# Patient Record
Sex: MALE | Race: WHITE | NOT HISPANIC OR LATINO
[De-identification: names, ages, dates, MRNs, and addresses within clinical notes are randomized per-mention and may not be internally consistent; named-entity substitution may affect disease eponyms.]

---

## 2017-12-02 PROBLEM — Z00.00 ENCOUNTER FOR PREVENTIVE HEALTH EXAMINATION: Status: ACTIVE | Noted: 2017-12-02

## 2017-12-06 ENCOUNTER — APPOINTMENT (OUTPATIENT)
Dept: NEUROLOGY | Facility: CLINIC | Age: 77
End: 2017-12-06
Payer: MEDICARE

## 2017-12-06 VITALS
DIASTOLIC BLOOD PRESSURE: 74 MMHG | SYSTOLIC BLOOD PRESSURE: 160 MMHG | WEIGHT: 212 LBS | BODY MASS INDEX: 28.71 KG/M2 | HEIGHT: 72 IN

## 2017-12-06 DIAGNOSIS — Z87.39 PERSONAL HISTORY OF OTHER DISEASES OF THE MUSCULOSKELETAL SYSTEM AND CONNECTIVE TISSUE: ICD-10-CM

## 2017-12-06 DIAGNOSIS — Z87.891 PERSONAL HISTORY OF NICOTINE DEPENDENCE: ICD-10-CM

## 2017-12-06 PROCEDURE — 99214 OFFICE O/P EST MOD 30 MIN: CPT

## 2017-12-06 RX ORDER — ALLOPURINOL 100 MG/1
100 TABLET ORAL
Refills: 0 | Status: ACTIVE | COMMUNITY

## 2017-12-06 RX ORDER — PRAVASTATIN SODIUM 40 MG/1
40 TABLET ORAL
Refills: 0 | Status: ACTIVE | COMMUNITY

## 2018-01-08 ENCOUNTER — MEDICATION RENEWAL (OUTPATIENT)
Age: 78
End: 2018-01-08

## 2018-01-08 RX ORDER — MEMANTINE HYDROCHLORIDE 10 MG/1
10 TABLET, FILM COATED ORAL
Refills: 0 | Status: COMPLETED | COMMUNITY

## 2018-01-08 RX ORDER — VIT C/E/ZN/COPPR/LUTEIN/ZEAXAN 250MG-90MG
CAPSULE ORAL
Refills: 0 | Status: ACTIVE | COMMUNITY

## 2018-01-09 ENCOUNTER — MEDICATION RENEWAL (OUTPATIENT)
Age: 78
End: 2018-01-09

## 2018-04-04 ENCOUNTER — APPOINTMENT (OUTPATIENT)
Dept: NEUROLOGY | Facility: CLINIC | Age: 78
End: 2018-04-04
Payer: MEDICARE

## 2018-04-04 VITALS
DIASTOLIC BLOOD PRESSURE: 72 MMHG | BODY MASS INDEX: 28.71 KG/M2 | SYSTOLIC BLOOD PRESSURE: 110 MMHG | HEIGHT: 72 IN | WEIGHT: 212 LBS

## 2018-04-04 DIAGNOSIS — Z86.39 PERSONAL HISTORY OF OTHER ENDOCRINE, NUTRITIONAL AND METABOLIC DISEASE: ICD-10-CM

## 2018-04-04 DIAGNOSIS — Z86.79 PERSONAL HISTORY OF OTHER DISEASES OF THE CIRCULATORY SYSTEM: ICD-10-CM

## 2018-04-04 PROCEDURE — 99214 OFFICE O/P EST MOD 30 MIN: CPT

## 2018-07-10 ENCOUNTER — MEDICATION RENEWAL (OUTPATIENT)
Age: 78
End: 2018-07-10

## 2018-10-03 ENCOUNTER — APPOINTMENT (OUTPATIENT)
Dept: NEUROLOGY | Facility: CLINIC | Age: 78
End: 2018-10-03
Payer: MEDICARE

## 2018-10-03 VITALS
BODY MASS INDEX: 28.71 KG/M2 | DIASTOLIC BLOOD PRESSURE: 74 MMHG | WEIGHT: 212 LBS | SYSTOLIC BLOOD PRESSURE: 136 MMHG | HEIGHT: 72 IN

## 2018-10-03 PROCEDURE — 99213 OFFICE O/P EST LOW 20 MIN: CPT

## 2018-10-03 NOTE — CONSULT LETTER
[Dear  ___] : Dear  [unfilled], [Courtesy Letter:] : I had the pleasure of seeing your patient, [unfilled], in my office today. [Please see my note below.] : Please see my note below. [Consult Closing:] : Thank you very much for allowing me to participate in the care of this patient.  If you have any questions, please do not hesitate to contact me. [Sincerely,] : Sincerely, [FreeTextEntry3] : Kunal Starks MD.

## 2018-10-03 NOTE — HISTORY OF PRESENT ILLNESS
[FreeTextEntry1] : I saw this patient in the office today.\par \par As you recall he has a history of dementia.\par His difficulty has been more for short-term memory than long-term memory.\par He began having memory difficulty around 2013. I had first seen him for this her in 2014. He already been started on Exelon a week or so before his visit here.\par His memory difficulty gradually progressed. I had added Namenda.\par The cognitive difficulties had persisted ever since.\par \par His wife had reported that he is started to have difficulty with activities of daily living including dressing and hygiene.\par \par His wife now reports that his insurance is not going to be covering Exelon after January 1.\par

## 2018-10-03 NOTE — PHYSICAL EXAM
[General Appearance - Alert] : alert [Oriented To Time, Place, And Person] : oriented to person, place, and time [Affect] : the affect was normal [Recall ___ / 3] : recall [unfilled] / 3 [Cranial Nerves Optic (II)] : visual acuity intact bilaterally,  visual fields full to confrontation, pupils equal round and reactive to light [Cranial Nerves Oculomotor (III)] : extraocular motion intact [Cranial Nerves Trigeminal (V)] : facial sensation intact symmetrically [Cranial Nerves Facial (VII)] : face symmetrical [Cranial Nerves Vestibulocochlear (VIII)] : hearing was intact bilaterally [Cranial Nerves Glossopharyngeal (IX)] : tongue and palate midline [Cranial Nerves Accessory (XI - Cranial And Spinal)] : head turning and shoulder shrug symmetric [Cranial Nerves Hypoglossal (XII)] : there was no tongue deviation with protrusion [Motor Tone] : muscle tone was normal in all four extremities [Motor Strength] : muscle strength was normal in all four extremities [Sensation Tactile Decrease] : light touch was intact [Sensation Vibration Decrease] : vibration was intact [Abnormal Walk] : normal gait [2+] : Patella left 2+ [Aphasia] : no dysphasia/aphasia [Romberg's Sign] : Romberg's sign was negtive [Coordination - Dysmetria Impaired Finger-to-Nose Bilateral] : not present [Plantar Reflex Right Only] : normal on the right [Plantar Reflex Left Only] : normal on the left

## 2018-10-03 NOTE — DATA REVIEWED
[de-identified] : CT scan of the head from 10/15/14 demonstrated only some mild atrophy. This was performed at [facility].\par \par B12 and thyroid function in the past have been normal. RPR was nonreactive.\par \par

## 2018-10-03 NOTE — ASSESSMENT
[FreeTextEntry1] : This is a 78-year-old man with Alzheimer's type dementia. \par \par I will continue his current regimen of Exelon and Namenda.\par His wife reports that after January his insurance is no longer going to cover Exelon. I have explained that if this is the case then we may be to continue the Namenda as monotherapy.\par \par I will see him back in 6 months.

## 2019-01-08 ENCOUNTER — MEDICATION RENEWAL (OUTPATIENT)
Age: 79
End: 2019-01-08

## 2019-04-03 ENCOUNTER — APPOINTMENT (OUTPATIENT)
Dept: NEUROLOGY | Facility: CLINIC | Age: 79
End: 2019-04-03
Payer: MEDICARE

## 2019-04-03 VITALS
SYSTOLIC BLOOD PRESSURE: 160 MMHG | WEIGHT: 197 LBS | DIASTOLIC BLOOD PRESSURE: 100 MMHG | HEIGHT: 72 IN | BODY MASS INDEX: 26.68 KG/M2

## 2019-04-03 PROCEDURE — 99213 OFFICE O/P EST LOW 20 MIN: CPT

## 2019-04-03 RX ORDER — LABETALOL HYDROCHLORIDE 100 MG/1
100 TABLET, FILM COATED ORAL
Refills: 0 | Status: COMPLETED | COMMUNITY
End: 2019-04-03

## 2019-04-03 RX ORDER — METOPROLOL SUCCINATE 100 MG/1
100 TABLET, EXTENDED RELEASE ORAL
Refills: 0 | Status: ACTIVE | COMMUNITY

## 2019-04-03 RX ORDER — RIVASTIGMINE TARTRATE 1.5 MG/1
1.5 CAPSULE ORAL
Refills: 0 | Status: COMPLETED | COMMUNITY
End: 2019-04-03

## 2019-04-03 NOTE — PHYSICAL EXAM
[General Appearance - Alert] : alert [General Appearance - In No Acute Distress] : in no acute distress [Oriented To Time, Place, And Person] : oriented to person, place, and time [Affect] : the affect was normal [Recall ___ / 3] : recall [unfilled] / 3 [Cranial Nerves Optic (II)] : visual acuity intact bilaterally,  visual fields full to confrontation, pupils equal round and reactive to light [Cranial Nerves Oculomotor (III)] : extraocular motion intact [Cranial Nerves Trigeminal (V)] : facial sensation intact symmetrically [Cranial Nerves Facial (VII)] : face symmetrical [Cranial Nerves Vestibulocochlear (VIII)] : hearing was intact bilaterally [Cranial Nerves Glossopharyngeal (IX)] : tongue and palate midline [Cranial Nerves Accessory (XI - Cranial And Spinal)] : head turning and shoulder shrug symmetric [Cranial Nerves Hypoglossal (XII)] : there was no tongue deviation with protrusion [Motor Tone] : muscle tone was normal in all four extremities [Motor Strength] : muscle strength was normal in all four extremities [Sensation Tactile Decrease] : light touch was intact [Sensation Pain / Temperature Decrease] : pain and temperature was intact [Sensation Vibration Decrease] : vibration was intact [Abnormal Walk] : normal gait [2+] : Patella left 2+ [Optic Disc Abnormality] : the optic disc were normal in size and color [Edema] : there was no peripheral edema [Involuntary Movements] : no involuntary movements were seen [Dysarthria] : no dysarthria [Romberg's Sign] : Romberg's sign was negtive [Coordination - Dysmetria Impaired Finger-to-Nose Bilateral] : not present [Plantar Reflex Right Only] : normal on the right [Plantar Reflex Left Only] : normal on the left

## 2019-04-03 NOTE — ASSESSMENT
[FreeTextEntry1] : This is a 78 year-old man with Alzheimer's type dementia. \par \par I will continue his current regimen of Namenda.\par \par I have referred him back to speech therapy at his wife's request.\par \par I will see him back in 6 months.

## 2019-04-03 NOTE — DATA REVIEWED
[de-identified] : CT scan of the head from 10/15/14 demonstrated only some mild atrophy. This was performed at [facility].\par \par B12 and thyroid function in the past have been normal. RPR was nonreactive.\par \par

## 2019-09-04 ENCOUNTER — MEDICATION RENEWAL (OUTPATIENT)
Age: 79
End: 2019-09-04

## 2019-10-03 ENCOUNTER — APPOINTMENT (OUTPATIENT)
Dept: NEUROLOGY | Facility: CLINIC | Age: 79
End: 2019-10-03
Payer: MEDICARE

## 2019-10-03 VITALS
BODY MASS INDEX: 21.81 KG/M2 | DIASTOLIC BLOOD PRESSURE: 70 MMHG | HEIGHT: 72 IN | WEIGHT: 161 LBS | SYSTOLIC BLOOD PRESSURE: 118 MMHG

## 2019-10-03 PROCEDURE — 99213 OFFICE O/P EST LOW 20 MIN: CPT

## 2019-10-03 RX ORDER — VALSARTAN 80 MG/1
80 TABLET, COATED ORAL
Refills: 0 | Status: COMPLETED | COMMUNITY
End: 2019-10-03

## 2019-10-03 RX ORDER — LOSARTAN POTASSIUM 100 MG/1
100 TABLET, FILM COATED ORAL
Refills: 0 | Status: ACTIVE | COMMUNITY

## 2019-10-03 RX ORDER — AMLODIPINE BESYLATE 2.5 MG/1
2.5 TABLET ORAL
Refills: 0 | Status: ACTIVE | COMMUNITY

## 2019-10-03 NOTE — DATA REVIEWED
[de-identified] : CT scan of the head from 10/15/14 demonstrated only some mild atrophy. This was performed at [facility].\par \par B12 and thyroid function in the past have been normal. RPR was nonreactive.\par \par

## 2019-10-03 NOTE — CONSULT LETTER
[Dear  ___] : Dear  [unfilled], [Please see my note below.] : Please see my note below. [Courtesy Letter:] : I had the pleasure of seeing your patient, [unfilled], in my office today. [Consult Closing:] : Thank you very much for allowing me to participate in the care of this patient.  If you have any questions, please do not hesitate to contact me. [Sincerely,] : Sincerely, [FreeTextEntry3] : Kunal Starks MD.

## 2019-10-03 NOTE — ASSESSMENT
[FreeTextEntry1] : This is a 79 year-old man with Alzheimer's type dementia. \par \par I will continue his current regimen of Namenda.\par \par I will see him back in 6 months.

## 2019-10-03 NOTE — PHYSICAL EXAM
[General Appearance - Alert] : alert [General Appearance - In No Acute Distress] : in no acute distress [Affect] : the affect was normal [Oriented To Time, Place, And Person] : oriented to person, place, and time [Recall ___ / 3] : recall [unfilled] / 3 [Cranial Nerves Optic (II)] : visual acuity intact bilaterally,  visual fields full to confrontation, pupils equal round and reactive to light [Cranial Nerves Oculomotor (III)] : extraocular motion intact [Cranial Nerves Facial (VII)] : face symmetrical [Cranial Nerves Trigeminal (V)] : facial sensation intact symmetrically [Cranial Nerves Glossopharyngeal (IX)] : tongue and palate midline [Cranial Nerves Vestibulocochlear (VIII)] : hearing was intact bilaterally [Cranial Nerves Accessory (XI - Cranial And Spinal)] : head turning and shoulder shrug symmetric [Cranial Nerves Hypoglossal (XII)] : there was no tongue deviation with protrusion [Motor Tone] : muscle tone was normal in all four extremities [Motor Strength] : muscle strength was normal in all four extremities [Sensation Tactile Decrease] : light touch was intact [Sensation Vibration Decrease] : vibration was intact [Sensation Pain / Temperature Decrease] : pain and temperature was intact [Abnormal Walk] : normal gait [2+] : Patella left 2+ [Optic Disc Abnormality] : the optic disc were normal in size and color [Involuntary Movements] : no involuntary movements were seen [Edema] : there was no peripheral edema [Dysarthria] : no dysarthria [Romberg's Sign] : Romberg's sign was negtive [Coordination - Dysmetria Impaired Finger-to-Nose Bilateral] : not present [Plantar Reflex Right Only] : normal on the right [Plantar Reflex Left Only] : normal on the left

## 2019-12-01 ENCOUNTER — INPATIENT (INPATIENT)
Facility: HOSPITAL | Age: 79
LOS: 1 days | Discharge: HOME | DRG: 100 | End: 2019-12-03
Attending: INTERNAL MEDICINE | Admitting: INTERNAL MEDICINE
Payer: MEDICARE

## 2019-12-01 ENCOUNTER — OUTPATIENT (OUTPATIENT)
Dept: OUTPATIENT SERVICES | Facility: HOSPITAL | Age: 79
LOS: 1 days | End: 2019-12-01
Payer: MEDICARE

## 2019-12-01 ENCOUNTER — TRANSCRIPTION ENCOUNTER (OUTPATIENT)
Age: 79
End: 2019-12-01

## 2019-12-01 VITALS — DIASTOLIC BLOOD PRESSURE: 84 MMHG | SYSTOLIC BLOOD PRESSURE: 121 MMHG

## 2019-12-01 DIAGNOSIS — R53.1 WEAKNESS: ICD-10-CM

## 2019-12-01 LAB
ALBUMIN SERPL ELPH-MCNC: 4.2 G/DL — SIGNIFICANT CHANGE UP (ref 3.3–5.2)
ALP SERPL-CCNC: 77 U/L — SIGNIFICANT CHANGE UP (ref 40–120)
ALT FLD-CCNC: 20 U/L — SIGNIFICANT CHANGE UP
ANION GAP SERPL CALC-SCNC: 20 MMOL/L — HIGH (ref 5–17)
APTT BLD: 35.7 SEC — SIGNIFICANT CHANGE UP (ref 27.5–36.3)
AST SERPL-CCNC: 22 U/L — SIGNIFICANT CHANGE UP
BASOPHILS # BLD AUTO: 0.06 K/UL — SIGNIFICANT CHANGE UP (ref 0–0.2)
BASOPHILS NFR BLD AUTO: 0.8 % — SIGNIFICANT CHANGE UP (ref 0–2)
BILIRUB SERPL-MCNC: 0.6 MG/DL — SIGNIFICANT CHANGE UP (ref 0.4–2)
BUN SERPL-MCNC: 27 MG/DL — HIGH (ref 8–20)
CALCIUM SERPL-MCNC: 9.4 MG/DL — SIGNIFICANT CHANGE UP (ref 8.6–10.2)
CHLORIDE SERPL-SCNC: 105 MMOL/L — SIGNIFICANT CHANGE UP (ref 98–107)
CO2 SERPL-SCNC: 17 MMOL/L — LOW (ref 22–29)
CREAT SERPL-MCNC: 1.45 MG/DL — HIGH (ref 0.5–1.3)
EOSINOPHIL # BLD AUTO: 0.14 K/UL — SIGNIFICANT CHANGE UP (ref 0–0.5)
EOSINOPHIL NFR BLD AUTO: 1.9 % — SIGNIFICANT CHANGE UP (ref 0–6)
GLUCOSE SERPL-MCNC: 135 MG/DL — HIGH (ref 70–115)
HBA1C BLD-MCNC: 5.3 % — SIGNIFICANT CHANGE UP (ref 4–5.6)
HCT VFR BLD CALC: 38 % — LOW (ref 39–50)
HGB BLD-MCNC: 12.5 G/DL — LOW (ref 13–17)
IMM GRANULOCYTES NFR BLD AUTO: 0.7 % — SIGNIFICANT CHANGE UP (ref 0–1.5)
INR BLD: 0.99 RATIO — SIGNIFICANT CHANGE UP (ref 0.88–1.16)
LYMPHOCYTES # BLD AUTO: 3.11 K/UL — SIGNIFICANT CHANGE UP (ref 1–3.3)
LYMPHOCYTES # BLD AUTO: 43.1 % — SIGNIFICANT CHANGE UP (ref 13–44)
MCHC RBC-ENTMCNC: 31.4 PG — SIGNIFICANT CHANGE UP (ref 27–34)
MCHC RBC-ENTMCNC: 32.9 GM/DL — SIGNIFICANT CHANGE UP (ref 32–36)
MCV RBC AUTO: 95.5 FL — SIGNIFICANT CHANGE UP (ref 80–100)
MONOCYTES # BLD AUTO: 0.49 K/UL — SIGNIFICANT CHANGE UP (ref 0–0.9)
MONOCYTES NFR BLD AUTO: 6.8 % — SIGNIFICANT CHANGE UP (ref 2–14)
NEUTROPHILS # BLD AUTO: 3.36 K/UL — SIGNIFICANT CHANGE UP (ref 1.8–7.4)
NEUTROPHILS NFR BLD AUTO: 46.7 % — SIGNIFICANT CHANGE UP (ref 43–77)
PLATELET # BLD AUTO: 166 K/UL — SIGNIFICANT CHANGE UP (ref 150–400)
POTASSIUM SERPL-MCNC: 4.2 MMOL/L — SIGNIFICANT CHANGE UP (ref 3.5–5.3)
POTASSIUM SERPL-SCNC: 4.2 MMOL/L — SIGNIFICANT CHANGE UP (ref 3.5–5.3)
PROT SERPL-MCNC: 7 G/DL — SIGNIFICANT CHANGE UP (ref 6.6–8.7)
PROTHROM AB SERPL-ACNC: 11.4 SEC — SIGNIFICANT CHANGE UP (ref 10–12.9)
RBC # BLD: 3.98 M/UL — LOW (ref 4.2–5.8)
RBC # FLD: 12.8 % — SIGNIFICANT CHANGE UP (ref 10.3–14.5)
SODIUM SERPL-SCNC: 142 MMOL/L — SIGNIFICANT CHANGE UP (ref 135–145)
TROPONIN T SERPL-MCNC: <0.01 NG/ML — SIGNIFICANT CHANGE UP (ref 0–0.06)
WBC # BLD: 7.21 K/UL — SIGNIFICANT CHANGE UP (ref 3.8–10.5)
WBC # FLD AUTO: 7.21 K/UL — SIGNIFICANT CHANGE UP (ref 3.8–10.5)

## 2019-12-01 PROCEDURE — G9001: CPT

## 2019-12-01 PROCEDURE — 99497 ADVNCD CARE PLAN 30 MIN: CPT | Mod: 25

## 2019-12-01 PROCEDURE — 70498 CT ANGIOGRAPHY NECK: CPT | Mod: 26

## 2019-12-01 PROCEDURE — 99223 1ST HOSP IP/OBS HIGH 75: CPT

## 2019-12-01 PROCEDURE — 70496 CT ANGIOGRAPHY HEAD: CPT | Mod: 26

## 2019-12-01 PROCEDURE — 71045 X-RAY EXAM CHEST 1 VIEW: CPT | Mod: 26

## 2019-12-01 PROCEDURE — 70450 CT HEAD/BRAIN W/O DYE: CPT | Mod: 26,59

## 2019-12-01 PROCEDURE — 93010 ELECTROCARDIOGRAM REPORT: CPT

## 2019-12-01 PROCEDURE — 99291 CRITICAL CARE FIRST HOUR: CPT

## 2019-12-01 PROCEDURE — 99223 1ST HOSP IP/OBS HIGH 75: CPT | Mod: AI

## 2019-12-01 PROCEDURE — 72125 CT NECK SPINE W/O DYE: CPT | Mod: 26

## 2019-12-01 RX ORDER — QUETIAPINE FUMARATE 200 MG/1
1 TABLET, FILM COATED ORAL
Qty: 0 | Refills: 0 | DISCHARGE

## 2019-12-01 RX ORDER — ALLOPURINOL 300 MG
1 TABLET ORAL
Qty: 0 | Refills: 0 | DISCHARGE

## 2019-12-01 RX ORDER — MIDAZOLAM HYDROCHLORIDE 1 MG/ML
2 INJECTION, SOLUTION INTRAMUSCULAR; INTRAVENOUS ONCE
Refills: 0 | Status: DISCONTINUED | OUTPATIENT
Start: 2019-12-01 | End: 2019-12-01

## 2019-12-01 RX ORDER — MEMANTINE HYDROCHLORIDE 10 MG/1
10 TABLET ORAL
Refills: 0 | Status: DISCONTINUED | OUTPATIENT
Start: 2019-12-01 | End: 2019-12-03

## 2019-12-01 RX ORDER — AMLODIPINE BESYLATE 2.5 MG/1
1 TABLET ORAL
Qty: 0 | Refills: 0 | DISCHARGE

## 2019-12-01 RX ORDER — ASPIRIN/CALCIUM CARB/MAGNESIUM 324 MG
81 TABLET ORAL DAILY
Refills: 0 | Status: DISCONTINUED | OUTPATIENT
Start: 2019-12-01 | End: 2019-12-01

## 2019-12-01 RX ORDER — MEMANTINE HYDROCHLORIDE 10 MG/1
1 TABLET ORAL
Qty: 0 | Refills: 0 | DISCHARGE

## 2019-12-01 RX ORDER — ASPIRIN/CALCIUM CARB/MAGNESIUM 324 MG
81 TABLET ORAL DAILY
Refills: 0 | Status: DISCONTINUED | OUTPATIENT
Start: 2019-12-01 | End: 2019-12-03

## 2019-12-01 RX ORDER — MIDAZOLAM HYDROCHLORIDE 1 MG/ML
6 INJECTION, SOLUTION INTRAMUSCULAR; INTRAVENOUS ONCE
Refills: 0 | Status: DISCONTINUED | OUTPATIENT
Start: 2019-12-01 | End: 2019-12-01

## 2019-12-01 RX ORDER — ATORVASTATIN CALCIUM 80 MG/1
40 TABLET, FILM COATED ORAL AT BEDTIME
Refills: 0 | Status: DISCONTINUED | OUTPATIENT
Start: 2019-12-01 | End: 2019-12-03

## 2019-12-01 RX ORDER — ASPIRIN/CALCIUM CARB/MAGNESIUM 324 MG
1 TABLET ORAL
Qty: 0 | Refills: 0 | DISCHARGE

## 2019-12-01 RX ORDER — SODIUM CHLORIDE 9 MG/ML
1000 INJECTION INTRAMUSCULAR; INTRAVENOUS; SUBCUTANEOUS ONCE
Refills: 0 | Status: COMPLETED | OUTPATIENT
Start: 2019-12-01 | End: 2019-12-01

## 2019-12-01 RX ORDER — MIDAZOLAM HYDROCHLORIDE 1 MG/ML
4 INJECTION, SOLUTION INTRAMUSCULAR; INTRAVENOUS ONCE
Refills: 0 | Status: DISCONTINUED | OUTPATIENT
Start: 2019-12-01 | End: 2019-12-01

## 2019-12-01 RX ORDER — ALLOPURINOL 300 MG
100 TABLET ORAL DAILY
Refills: 0 | Status: DISCONTINUED | OUTPATIENT
Start: 2019-12-01 | End: 2019-12-03

## 2019-12-01 RX ORDER — AMLODIPINE BESYLATE 2.5 MG/1
5 TABLET ORAL DAILY
Refills: 0 | Status: DISCONTINUED | OUTPATIENT
Start: 2019-12-01 | End: 2019-12-03

## 2019-12-01 RX ORDER — QUETIAPINE FUMARATE 200 MG/1
25 TABLET, FILM COATED ORAL
Refills: 0 | Status: DISCONTINUED | OUTPATIENT
Start: 2019-12-01 | End: 2019-12-03

## 2019-12-01 RX ADMIN — SODIUM CHLORIDE 1000 MILLILITER(S): 9 INJECTION INTRAMUSCULAR; INTRAVENOUS; SUBCUTANEOUS at 11:27

## 2019-12-01 RX ADMIN — MIDAZOLAM HYDROCHLORIDE 2 MILLIGRAM(S): 1 INJECTION, SOLUTION INTRAMUSCULAR; INTRAVENOUS at 10:15

## 2019-12-01 RX ADMIN — MIDAZOLAM HYDROCHLORIDE 4 MILLIGRAM(S): 1 INJECTION, SOLUTION INTRAMUSCULAR; INTRAVENOUS at 10:07

## 2019-12-01 RX ADMIN — MIDAZOLAM HYDROCHLORIDE 2 MILLIGRAM(S): 1 INJECTION, SOLUTION INTRAMUSCULAR; INTRAVENOUS at 10:00

## 2019-12-01 NOTE — H&P ADULT - ASSESSMENT
Patient is a 82 yo male with hx of Alzheimer's Dementia, HTN, CKD stage II who comes with a fall at home that does not seem to have been associated with tripping event as patient fell suddenly and it was followed by a period of unresponsiveness and jerking movement of patient's arms and legs, did not bite tongue or lose bowel/bladder continence. He is now admitted for new onset seizures. Patient was a code stroke on admission with NIHSS of 10. Seen by neurology who feel patient had a seizure.    #new onset seizure  - check EEG  - hold off on AED unless abnormal EEG  - admit to monitored bed  - neurology following    #dementia with behavioral disturbance  - resume memantine and quetiapine BID  - may need antipsychotics if starts to become more lucid overnight in unfamiliar setting  - would opt for haldol over benzodiazepine    #HTN  - resume amlodipine    #CKD II  - stable    #DVT ppx  - SCD

## 2019-12-01 NOTE — ED PROVIDER NOTE - NIH STROKE SCALE: 9. BEST LANGUAGE
(1) Mild-to-moderate aphasia; some obvious loss of fluency or facility of comprehension, w/o significant limitation on ideas expressed or form of expression. Reduction of speech and/or comprehension, however, makes conversation about provided material difficult or impossible.  For example, in conversation about provided materials, examiner can identify picture or naming card content from patient's response. (2) Severe aphasia; all communication is through fragmentary expression; great need for inference, questioning, and guessing by the listener. Range of information that can be exchanged is limited; listener carries burden of communication. Examiner cannot identify materials provided from patient response.

## 2019-12-01 NOTE — CONSULT NOTE ADULT - SUBJECTIVE AND OBJECTIVE BOX
[facility]                                        Neu[facility] at [place]                                  [facility]                                      [address]                                           [place], [place], [address]                                                [phone]        CC: head injury and possible seizure.    HISTORY:  The patient is a 79y Male with a history of Alzheimer's dementia who is known to me from the office.   He has been on Memantine 10 mg BID.   He was hospitalized at [facility] in 2019 and Seroquel was added.     He now presented after a fall. His wife reports that they were leaving the house and he fell and hit his head. She noted some shaking and felt that he may have had some drooping of the left side of the mouth. He has been poorly responsive since.   In the ER he was agitated and received Versed.     Stroke code was reportedly activated.   I was called by the ER at 11:45 am the morning of arrival.     At baseline the patient is alert and able to speak although memory is 0/3 @ 5 minutes. He ambulates without an assistive device.     PAST MEDICAL & SURGICAL HISTORY:  Hypertension   Gout   Dementia  Cholesterol.     MEDICATION PRIOR TO ADMISSION:  Aspirin 81 mg  Allopurinol  Amlodipine  Memantine 10 mg BID  Pravastatin  Quetiapine 25 mg    MEDICATIONS  (STANDING):  aspirin enteric coated 81 milliGRAM(s) Oral daily  atorvastatin 40 milliGRAM(s) Oral at bedtime  midazolam Injectable 2 milliGRAM(s) IV Push Once  midazolam Injectable 2 milliGRAM(s) IV Push Once    Allergies  No Known Allergies    SOCIAL HISTORY:  Non smoker.     FAMILY HISTORY:  Mother: . No known history of stroke.   Father: . No known history of stroke.   Sibling: No known history of stroke.   Child alive and well.     ROS:  Constitutional: Unobtainable due to patient's condition.   Neuro: Unobtainable due to patient's condition.   Eyes: Unobtainable due to patient's condition.   Ears/nose/throat: Unobtainable due to patient's condition.   Cardiac: Unobtainable due to patient's condition.   Respiratory: Unobtainable due to patient's condition.   GI: Unobtainable due to patient's condition.   : Unobtainable due to patient's condition..  Integumentary: Unobtainable due to patient's condition.  Psych: Unobtainable due to patient's condition.  Heme: Unobtainable due to patient's condition.     Exam:  Vital Signs Last 24 Hrs  T(C): 36.7 (01 Dec 2019 10:28), Max: 36.7 (01 Dec 2019 10:28)  T(F): 98 (01 Dec 2019 10:28), Max: 98 (01 Dec 2019 10:28)  HR: 73 (01 Dec 2019 11:27) (70 - 73)  BP: 100/55 (01 Dec 2019 11:27) (81/58 - 121/84)  RR: 22 (01 Dec 2019 11:) (18 - 22)  SpO2: 97% (01 Dec 2019 11:) (97% - 98%)  General: NAD.   Carotid bruits absent.     Mental status: The patient opens eyes briefly to stimuli. He does not answer questions or follow instructions.     Cranial nerves: There is no papilledema. Pupils react symmetrically to light. He blinks to threat bilaterally. He tracks slightly with gaze. There is a subtle depression of the left nasolabial fold although dentures are not in place.   Palate and tongue are unable to be assessed.     Motor/Sensory: There is normal bulk and tone.  Symmetric limb movement to stimuli.     Reflexes: 2+ throughout and plantar responses are flexor.    Cerebellar: Cannot be assessed.     LABS:                         12.5   7.21  )-----------( 166      ( 01 Dec 2019 10:11 )             38.0       12    142  |  105  |  27.0<H>  ----------------------------<  135<H>  4.2   |  17.0<L>  |  1.45<H>    Ca    9.4      01 Dec 2019 10:11    TPro  7.0  /  Alb  4.2  /  TBili  0.6  /  DBili  x   /  AST  22  /  ALT  20  /  AlkPhos  77  12      PT/INR - ( 01 Dec 2019 10:11 )   PT: 11.4 sec;   INR: 0.99 ratio    PTT - ( 01 Dec 2019 10:11 )  PTT:35.7 sec    RADIOLOGY   CT head images reviewed (and concur with report): There is no acute pathology. There is atrophy and ischemic white matter disease of a chronic nature.     CT-A brain and neck were negative for any significant stenosis or large vessel occlusion.

## 2019-12-01 NOTE — ED PROVIDER NOTE - PROGRESS NOTE DETAILS
Given the significant and immediate threats to this patient based on initial presentation, the benefits of emergency contrast-enhanced CT imaging without obtaining GFR/creatinine serum level results greatly outweigh the potential risk of harm due to contrast-induced nephropathy. Family arrived and states patient had seizure-like activity with tonic-clonic movements and eyes rolled back prior to the onset of ams and some left facial droop and left sided weakness.  discussed tpa benefits/risks with patient and family states they are refusing TPA at this time, since patient had similar event 1 year ago with complete resolution and negative w/up.

## 2019-12-01 NOTE — ED ADULT NURSE REASSESSMENT NOTE - NS ED NURSE REASSESS COMMENT FT1
Report given to accepting unit MICHAEL Oropeza discussed with pt family at bedside who verbalize understanding and agree, pt placed on transfer CM at this time, VSS, safety maintained awaiting transport to unit.

## 2019-12-01 NOTE — ED ADULT NURSE REASSESSMENT NOTE - NS ED NURSE REASSESS COMMENT FT1
Admitting physician at pt bedside at this time addressing shoulder pain complaint from family. Awaiting further orders.

## 2019-12-01 NOTE — DISCHARGE NOTE NURSING/CASE MANAGEMENT/SOCIAL WORK - PATIENT PORTAL LINK FT
You can access the FollowMyHealth Patient Portal offered by [facility] by registering at the following website: [email or web address]. By joining [facility]’s FollowMyHealth portal, you will also be able to view your health information using other applications (apps) compatible with our system.

## 2019-12-01 NOTE — ED PROVIDER NOTE - CARE PLAN
Principal Discharge DX:	Seizure Principal Discharge DX:	Weakness of left side of body  Secondary Diagnosis:	Seizure

## 2019-12-01 NOTE — H&P ADULT - NSHPREVIEWOFSYSTEMS_GEN_ALL_CORE
REVIEW OF SYSTEMS  General: denies weakness, malaise  Skin/Breast: no new rash  Ophthalmologic: no change in vision  ENMT: no dysphagia, throat pain or change in hearing  Respiratory and Thorax: no difficulty breathing or chest pain  Cardiovascular: no palpitations or PND, orthopnea  Gastrointestinal: no abdominal pain, normal bowel movement, no dark stools  Genitourinary: no difficulty urinating, no burning urination  Musculoskeletal: no myalgia/arthrlagia  Neurological: no weakness, numbness, change in gait; +seizure  Psychiatric: no depression, anxiety  Hematology/Lymphatics: denies easy bruising or bleeding  Endocrine: no polyuria, polydipsia

## 2019-12-01 NOTE — ED ADULT NURSE REASSESSMENT NOTE - NS ED NURSE REASSESS COMMENT FT1
Bedside report recvd from off going RN, pt recvd sitting up on stretcher, pt on CM, pt A&O to self, family at bedside providing support, POC discussed, pt has a bed assignment awaiting acceptance from unit RN, family verbalize understanding and agree, pt to have further testing in am, family reports pt c/o of right shoulder pain, findings reported to admitting physician, awaiting further orders, VSS, safety maintained.

## 2019-12-01 NOTE — ED PROVIDER NOTE - OBJECTIVE STATEMENT
79yoM; with pmh signif for Dementia, HTN, HLD; now p/w ams s/p fall.  patient was getting ready with and tripped and fell. +head trauma. +loc. now acting more confused and combative.  denies vomiting as per family.  PMH: Dementia, HTN, HLD  SOCIAL: No tobacco/illicit substance use/EtOH

## 2019-12-01 NOTE — ED ADULT TRIAGE NOTE - CHIEF COMPLAINT QUOTE
as per EMS pt fell today.pt is nonverbal in triage. as per daughter pt is more agitated then baseline. pt has dementia. pt is agiated and RN unable to get vitals at this time. MD matias called to bedside. as per EMS pt fell today. pt is nonverbal in triage. as per daughter pt is more agitated then baseline. pt has dementia. pt is agitated and RN unable to get vitals at this time. MD Sarah called to bedside. as per daughter pt was "walking to go for breakfast with his wife and just collapsed." wife states pt hit head on door way. as per EMS pt fell today. pt is nonverbal in triage. as per daughter pt is more agitated then baseline. pt has dementia. pt is agitated and RN unable to get vitals at this time. MD Sarah called to bedside. as per daughter pt was "walking to go for breakfast with his wife and just collapsed." wife states pt hit head on door way. last known well approx 30 mins pta.

## 2019-12-01 NOTE — H&P ADULT - NSICDXPASTMEDICALHX_GEN_ALL_CORE_FT
PAST MEDICAL HISTORY:  CKD (chronic kidney disease), stage II     Dementia, Alzheimer's, with behavior disturbance     Hypertension

## 2019-12-01 NOTE — ED ADULT NURSE NOTE - NSIMPLEMENTINTERV_GEN_ALL_ED
Implemented All Fall with Harm Risk Interventions:  [place] to call system. Call bell, personal items and telephone within reach. Instruct patient to call for assistance. Room bathroom lighting operational. Non-slip footwear when patient is off stretcher. Physically safe environment: no spills, clutter or unnecessary equipment. Stretcher in lowest position, wheels locked, appropriate side rails in place. Provide visual cue, wrist band, yellow gown, etc. Monitor gait and stability. Monitor for mental status changes and reorient to person, place, and time. Review medications for side effects contributing to fall risk. Reinforce activity limits and safety measures with patient and family. Provide visual clues: red socks.

## 2019-12-01 NOTE — ED ADULT NURSE NOTE - CHIEF COMPLAINT QUOTE
as per EMS pt fell today. pt is nonverbal in triage. as per daughter pt is more agitated then baseline. pt has dementia. pt is agitated and RN unable to get vitals at this time. MD Sarah called to bedside. as per daughter pt was "walking to go for breakfast with his wife and just collapsed." wife states pt hit head on door way. last known well approx 30 mins pta.

## 2019-12-01 NOTE — ED ADULT NURSE NOTE - ED NEURO NIH ASSESS
PROCEDURE DATE: 4/25/2017    PROCEDURE:  Caudal epidural steroid injection under fluoroscopy.    Diagnosis: Lumbar radiculopathy    Post Op diagnosis: Same    PHYSICIAN: Adan Samuels M.D.    MEDICATIONS INJECTED:  80 mg of methylprednisone and 4 ml of sterile, preservative-free NaCl.    LOCAL ANESTHETIC GIVEN:  Lidocaine 1%, 4 ml total    SEDATION MEDICATIONS: none    ESTIMATED BLOOD LOSS:  none    COMPLICATIONS:  none    TECHNIQUE:   After the patient was placed in prone position, the patient was prepped and draped in the usual sterile fashion using ChloraPrep and sterile towels.  Appropriate anatomic landmarks were determined by identifying the sacral hiatus in the lateral fluoroscopic view.  Local anesthetic was given via a 25g 1.5 inch needle by raising a wheal and infiltrating down to the periosteum.  A 3.5 inch 22 gauge needle was introduced thru the sacral hiatus.  Omnipaque was injected to confirm placement in the appropriate area and that there was no vascular uptake.  The medication was then injected slowly.  The patient tolerated the procedure well.    The patient was monitored after the procedure.  Patient was given post procedure and discharge instructions to follow at home.  The patient was discharged in a stable condition     baseline

## 2019-12-01 NOTE — CONSULT NOTE ADULT - ASSESSMENT
The patient is a 79y Male with baseline dementia now status post fall with possible seizure.  Presentation was not suggestive of stroke.     Seizure  Unclear if this was a simple mechanical fall with head injury or if patient had a seizure.   Will check EEG.   Hold of on antiepileptic drug for now.   Mobilize with physical therapy.     Dementia  Continue Memantine.     Case discussed with ER attending Dr Sarah.

## 2019-12-01 NOTE — ED PROVIDER NOTE - CLINICAL SUMMARY MEDICAL DECISION MAKING FREE TEXT BOX
patient with seizure, head injury and subsequent left sided facial droop and weakness. code stroke called, family offered tpa but refused, neurology called, will admit for further evaluation

## 2019-12-01 NOTE — ED PROVIDER NOTE - PHYSICAL EXAMINATION
Gen: combative, non-verbal  Head: NC, AT, PERRL, EOMI, normal lids/conjunctiva  Neck: +supple, no tenderness/meningismus/JVD, +Trachea midline  Pulm: Bilateral BS, normal resp effort, no wheeze/stridor/retractions  CV: RRR, no M/R/G, +dist pulses  Abd: soft, NT/ND, +BS, no hepatosplenomegaly  Mskel: no edema/erythema/cyanosis  Neuro: awake, combative, see stroke note

## 2019-12-01 NOTE — H&P ADULT - NSHPPHYSICALEXAM_GEN_ALL_CORE
PHYSICAL EXAM:  General: Well developed; well nourished; in no acute distress  Eyes: PERRLA, EOMI; conjunctiva and sclera clear  Head: Normocephalic; atraumatic  ENMT: No nasal discharge; airway clear  Neck: Supple; non tender; no masses  Respiratory: No wheezes, rales or rhonchi  Cardiovascular: Regular rate and rhythm. S1 and S2 Normal; No murmurs, gallops or rubs  Gastrointestinal: Soft non-tender non-distended; Normal bowel sounds  Extremities: Normal range of motion, No clubbing, cyanosis or edema  Vascular: Peripheral pulses palpable 2+ bilaterally  Neurological: not alert or oriented; moves extremities freely, no slurred speech, pupils equal, equal tone throughout

## 2019-12-01 NOTE — H&P ADULT - NSHPLABSRESULTS_GEN_ALL_CORE
12.5   7.21  )-----------( 166      ( 01 Dec 2019 10:11 )             38.0     12-01    142  |  105  |  27.0<H>  ----------------------------<  135<H>  4.2   |  17.0<L>  |  1.45<H>    Ca    9.4      01 Dec 2019 10:11    TPro  7.0  /  Alb  4.2  /  TBili  0.6  /  DBili  x   /  AST  22  /  ALT  20  /  AlkPhos  77  12-01    < from: Xray Chest 1 View- PORTABLE-Urgent (12.01.19 @ 11:12) >    Impression:  No evidence of acute cardiopulmonary disease..    < end of copied text >    < from: CT Cervical Spine No Cont (12.01.19 @ 10:48) >      IMPRESSION:  No acute fracture or traumatic subluxation    < end of copied text >    < from: CT Angio Neck w/ IV Cont (12.01.19 @ 10:35) >    IMPRESSION:          1.   Right carotid system:  No hemodynamically significant stenosis.    Mild (30%) stenosis at the proximal RIGHT internal carotid artery.        2.   Left carotid system:  No hemodynamically significant stenosis.    Mild (30%) stenosis at the proximal LEFT internal carotid artery.        3.   Intracranial circulation:  No significant vascular lesion.    Anatomic variations as described.    < end of copied text >

## 2019-12-01 NOTE — ED PROVIDER NOTE - CRITICAL CARE PROVIDED
additional history taking/direct patient care (not related to procedure)/consultation with other physicians/documentation/consult w/ pt's family directly relating to pts condition

## 2019-12-01 NOTE — H&P ADULT - HISTORY OF PRESENT ILLNESS
Patient is a 80 yo male with hx of Alzheimer's Dementia, HTN, CKD stage II who comes with a fall at home that does not seem to have been associated with tripping event as patient fell suddenly and it was followed by a period of unresponsiveness and jerking movement of patient's arms and legs, did not bite tongue or lose bowel/bladder continence. Patient did hit his head on the fall. Patient started to become awake in the ED but was confused. He received 1 dose of midazolam in the ED. Patient at time of evaluation was lethargic and sleeping, difficult to keep awake. History obtained by family members at bedside.

## 2019-12-01 NOTE — ED ADULT NURSE NOTE - OBJECTIVE STATEMENT
Assumed care at 1000 pt very combative at triage, as per daughter pt was with his wofe today woke up normal and as they were going to leave the house he fell on his side and was shaking. pt then became very agitated and has a left sided droop. code stroke activated MD Sarah at [facility].

## 2019-12-02 LAB
ANION GAP SERPL CALC-SCNC: 13 MMOL/L — SIGNIFICANT CHANGE UP (ref 5–17)
APPEARANCE UR: CLEAR — SIGNIFICANT CHANGE UP
BACTERIA # UR AUTO: ABNORMAL
BILIRUB UR-MCNC: NEGATIVE — SIGNIFICANT CHANGE UP
BUN SERPL-MCNC: 20 MG/DL — SIGNIFICANT CHANGE UP (ref 8–20)
CALCIUM SERPL-MCNC: 9 MG/DL — SIGNIFICANT CHANGE UP (ref 8.6–10.2)
CHLORIDE SERPL-SCNC: 107 MMOL/L — SIGNIFICANT CHANGE UP (ref 98–107)
CHOLEST SERPL-MCNC: 124 MG/DL — SIGNIFICANT CHANGE UP (ref 110–199)
CO2 SERPL-SCNC: 21 MMOL/L — LOW (ref 22–29)
COLOR SPEC: YELLOW — SIGNIFICANT CHANGE UP
CREAT SERPL-MCNC: 1.28 MG/DL — SIGNIFICANT CHANGE UP (ref 0.5–1.3)
DIFF PNL FLD: NEGATIVE — SIGNIFICANT CHANGE UP
EPI CELLS # UR: SIGNIFICANT CHANGE UP
GLUCOSE SERPL-MCNC: 94 MG/DL — SIGNIFICANT CHANGE UP (ref 70–115)
GLUCOSE UR QL: NEGATIVE MG/DL — SIGNIFICANT CHANGE UP
HCT VFR BLD CALC: 32.6 % — LOW (ref 39–50)
HDLC SERPL-MCNC: 44 MG/DL — SIGNIFICANT CHANGE UP
HGB BLD-MCNC: 10.9 G/DL — LOW (ref 13–17)
KETONES UR-MCNC: NEGATIVE — SIGNIFICANT CHANGE UP
LEUKOCYTE ESTERASE UR-ACNC: NEGATIVE — SIGNIFICANT CHANGE UP
LIPID PNL WITH DIRECT LDL SERPL: 61 MG/DL — SIGNIFICANT CHANGE UP
MCHC RBC-ENTMCNC: 31 PG — SIGNIFICANT CHANGE UP (ref 27–34)
MCHC RBC-ENTMCNC: 33.4 GM/DL — SIGNIFICANT CHANGE UP (ref 32–36)
MCV RBC AUTO: 92.6 FL — SIGNIFICANT CHANGE UP (ref 80–100)
NITRITE UR-MCNC: NEGATIVE — SIGNIFICANT CHANGE UP
PH UR: 6 — SIGNIFICANT CHANGE UP (ref 5–8)
PLATELET # BLD AUTO: 138 K/UL — LOW (ref 150–400)
POTASSIUM SERPL-MCNC: 3.7 MMOL/L — SIGNIFICANT CHANGE UP (ref 3.5–5.3)
POTASSIUM SERPL-SCNC: 3.7 MMOL/L — SIGNIFICANT CHANGE UP (ref 3.5–5.3)
PROT UR-MCNC: 100 MG/DL
RBC # BLD: 3.52 M/UL — LOW (ref 4.2–5.8)
RBC # FLD: 12.8 % — SIGNIFICANT CHANGE UP (ref 10.3–14.5)
RBC CASTS # UR COMP ASSIST: SIGNIFICANT CHANGE UP /HPF (ref 0–4)
SODIUM SERPL-SCNC: 141 MMOL/L — SIGNIFICANT CHANGE UP (ref 135–145)
SP GR SPEC: 1.01 — SIGNIFICANT CHANGE UP (ref 1.01–1.02)
TOTAL CHOLESTEROL/HDL RATIO MEASUREMENT: 3 RATIO — LOW (ref 3.4–9.6)
TRIGL SERPL-MCNC: 95 MG/DL — SIGNIFICANT CHANGE UP (ref 10–200)
UROBILINOGEN FLD QL: NEGATIVE MG/DL — SIGNIFICANT CHANGE UP
WBC # BLD: 4.54 K/UL — SIGNIFICANT CHANGE UP (ref 3.8–10.5)
WBC # FLD AUTO: 4.54 K/UL — SIGNIFICANT CHANGE UP (ref 3.8–10.5)
WBC UR QL: SIGNIFICANT CHANGE UP

## 2019-12-02 PROCEDURE — 99232 SBSQ HOSP IP/OBS MODERATE 35: CPT

## 2019-12-02 PROCEDURE — 99497 ADVNCD CARE PLAN 30 MIN: CPT

## 2019-12-02 PROCEDURE — 95819 EEG AWAKE AND ASLEEP: CPT | Mod: 26

## 2019-12-02 RX ADMIN — ATORVASTATIN CALCIUM 40 MILLIGRAM(S): 80 TABLET, FILM COATED ORAL at 21:57

## 2019-12-02 RX ADMIN — Medication 81 MILLIGRAM(S): at 08:20

## 2019-12-02 RX ADMIN — Medication 100 MILLIGRAM(S): at 08:20

## 2019-12-02 RX ADMIN — AMLODIPINE BESYLATE 5 MILLIGRAM(S): 2.5 TABLET ORAL at 06:31

## 2019-12-02 RX ADMIN — QUETIAPINE FUMARATE 25 MILLIGRAM(S): 200 TABLET, FILM COATED ORAL at 17:36

## 2019-12-02 RX ADMIN — MEMANTINE HYDROCHLORIDE 10 MILLIGRAM(S): 10 TABLET ORAL at 06:31

## 2019-12-02 RX ADMIN — MEMANTINE HYDROCHLORIDE 10 MILLIGRAM(S): 10 TABLET ORAL at 17:36

## 2019-12-02 RX ADMIN — QUETIAPINE FUMARATE 25 MILLIGRAM(S): 200 TABLET, FILM COATED ORAL at 06:31

## 2019-12-02 NOTE — OCCUPATIONAL THERAPY INITIAL EVALUATION ADULT - PHYSICAL ASSIST/NONPHYSICAL ASSIST, OT EVAL
verbal cues/1 person assist/nonverbal cues (demo/gestures) 1 person assist/set-up required/nonverbal cues (demo/gestures)/verbal cues

## 2019-12-02 NOTE — PROGRESS NOTE ADULT - ASSESSMENT
79y Male with baseline dementia now status post fall with possible seizure.  Presentation was not suggestive of stroke.     Seizure  Unclear if this was a simple mechanical fall with head injury or if patient had a seizure.   Await EEG.   Hold of on antiepileptic drug for now.   Mobilize with physical therapy.     Dementia  Continue Memantine.     Case discussed with ER attending Dr Tim.

## 2019-12-02 NOTE — OCCUPATIONAL THERAPY INITIAL EVALUATION ADULT - MANUAL MUSCLE TESTING RESULTS, REHAB EVAL
bilateral shoulders grossly assessed with AROM against gravity 3/5; bilateral elbows grossly assessed with AROM against gravity 3/5; bilateral gross grasp 3+/5

## 2019-12-02 NOTE — PROGRESS NOTE ADULT - SUBJECTIVE AND OBJECTIVE BOX
Patient is a 79y old  Male who presents with a chief complaint of New onset seizure (01 Dec 2019 11:45)       INTERVAL HPI/OVERNIGHT EVENTS: No overnight events    Pt seen and examined at bedside  Laying in bed, pleasantly confused, A&O x 1  No complaints, though poor historian  VSS at bedside    Vital Signs Last 24 Hrs  T(C): 36.6 (02 Dec 2019 04:00), Max: 36.9 (01 Dec 2019 22:28)  T(F): 97.8 (02 Dec 2019 04:00), Max: 98.5 (02 Dec 2019 00:15)  HR: 77 (02 Dec 2019 08:26) (64 - 99)  BP: 123/63 (02 Dec 2019 08:26) (81/58 - 154/61)  BP(mean): 78 (02 Dec 2019 08:26) (78 - 88)  RR: 13 (02 Dec 2019 08:26) (13 - 22)  SpO2: 99% (02 Dec 2019 08:26) (95% - 100%) on RA    PHYSICAL EXAM:  GENERAL: NAD   HEAD:  Atraumatic, Normocephalic  EYES: EOMI, PERRLA, conjunctiva and sclera clear  ENMT: No tonsillar erythema, exudates, or enlargement; Moist mucous membranes, Good dentition, No lesions  NECK: Supple, No JVD, Normal thyroid  NERVOUS SYSTEM:  Alert & Oriented X1, pleasantly confused, follows all commands, motor/sensory intact to all extremities   CHEST/LUNG: Clear to auscultation bilaterally; No rales, rhonchi, wheezing, or rubs  HEART: Regular rate and rhythm; No murmurs, rubs, or gallops  ABDOMEN: Soft, Nontender, Nondistended; Bowel sounds present  EXTREMITIES:  2+ Peripheral Pulses, No clubbing, cyanosis, or edema      LABS:                        10.9   4.54  )-----------( 138      ( 02 Dec 2019 06:02 )             32.6     02 Dec 2019 06:02    141    |  107    |  20.0   ----------------------------<  94     3.7     |  21.0   |  1.28     Ca    9.0        02 Dec 2019 06:02    PT/INR - ( 01 Dec 2019 10:11 )   PT: 11.4 sec;   INR: 0.99 ratio      PTT - ( 01 Dec 2019 10:11 )  PTT:35.7 sec    CAPILLARY BLOOD GLUCOSE  131 (01 Dec 2019 10:39)    RADIOLOGY & ADDITIONAL TESTS:

## 2019-12-02 NOTE — PROGRESS NOTE ADULT - SUBJECTIVE AND OBJECTIVE BOX
[facility]                                        Neurology at [place]                                 [facility]                                  [address]                                        [place], [place], [address]                                             [phone]        CC: head injury and possible seizure. Dementia.     HPI:   The patient is a 79y Male with a history of Alzheimer's dementia who is known to me from the office.   He has been on Memantine 10 mg BID.   He was hospitalized at [facility] in October of 2019 and Seroquel was added.     He now presented after a fall. His wife reports that they were leaving the house and he fell and hit his head. She noted some shaking and felt that he may have had some drooping of the left side of the mouth. He has been poorly responsive since.   In the ER he was agitated and received Versed.     Stroke code was reportedly activated.   I was called by the ER at 11:45 am the morning of arrival.     At baseline the patient is alert and able to speak although memory is 0/3 @ 5 minutes. He ambulates without an assistive device.       Interim history:  Now on 4 Jeffy.   More alert today.    ROS:   Denies headache or dizziness.  Denies chest pain.  Denies shortness of breath.    MEDICATIONS  (STANDING):  allopurinol 100 milliGRAM(s) Oral daily  amLODIPine   Tablet 5 milliGRAM(s) Oral daily  aspirin enteric coated 81 milliGRAM(s) Oral daily  atorvastatin 40 milliGRAM(s) Oral at bedtime  memantine 10 milliGRAM(s) Oral two times a day  QUEtiapine 25 milliGRAM(s) Oral two times a day      Vital Signs Last 24 Hrs  T(C): 36.6 (02 Dec 2019 04:00), Max: 36.9 (01 Dec 2019 22:28)  T(F): 97.8 (02 Dec 2019 04:00), Max: 98.5 (02 Dec 2019 00:15)  HR: 77 (02 Dec 2019 08:26) (64 - 99)  BP: 123/63 (02 Dec 2019 08:26) (100/55 - 154/61)  BP(mean): 78 (02 Dec 2019 08:26) (78 - 88)  RR: 13 (02 Dec 2019 08:26) (13 - 22)  SpO2: 99% (02 Dec 2019 08:26) (95% - 100%)    Detailed Neurologic Exam:    Mental status: The patient is awake and alert. He answers simple questions and follows simple instructions. Memory is poor.     Cranial nerves: Pupils equal and react symmetrically to light. There is no visual field deficit to threat. Extraocular motion is full with no nystagmus. There is no ptosis. Facial sensation is intact. Facial musculature is symmetric. Palate elevates symmetrically. Tongue is midline.    Motor: There is normal bulk and tone.  There is no tremor.  Strength grossly 5/5 bilaterally.    Sensation: Grossly intact to light touch and pin.    Reflexes: 2+ throughout and plantar responses are flexor.    Cerebellar: No dysmetria on finger nose testing.    Labs:     12-02    141  |  107  |  20.0  ----------------------------<  94  3.7   |  21.0<L>  |  1.28    Ca    9.0      02 Dec 2019 06:02    TPro  7.0  /  Alb  4.2  /  TBili  0.6  /  DBili  x   /  AST  22  /  ALT  20  /  AlkPhos  77  12-01                            10.9   4.54  )-----------( 138      ( 02 Dec 2019 06:02 )             32.6       Rad:   ***

## 2019-12-02 NOTE — OCCUPATIONAL THERAPY INITIAL EVALUATION ADULT - PLANNED THERAPY INTERVENTIONS, OT EVAL
toilet/motor coordination training/fine motor coordination training/transfer training/ADL retraining/balance training/bed mobility training/strengthening toilet/fine motor coordination training/parent/caregiver training.../transfer training/motor coordination training/balance training/ADL retraining/bed mobility training/strengthening

## 2019-12-02 NOTE — OCCUPATIONAL THERAPY INITIAL EVALUATION ADULT - ADDITIONAL COMMENTS
Pt lives in a house with 3 WANDA and 12 stairs inside to bedroom. Bathroom upstairs has a tub with doors, bathroom on the main level has a shower with curtains. Pt does not own any DME. Pt is left handed. Pt does not drive. Pt's wife reports they have an aide come in 6 days a week for 5 hours to assist pt with ADLs. Pt lives in a house with 3 WANDA and 12 stairs inside to bedroom. Bathroom upstairs has a bathtub with doors, bathroom on the main level has a shower stall with curtains. Pt does not own any DME. Pt is left handed. Pt does not drive. Pt's wife reports they have an aide come in 6 days a week/week for 5 hours/day to assist pt with ADLs and wife assists when aide is not present. Wife reports pt independently ambulated without device prior to admission with increased time.

## 2019-12-02 NOTE — OCCUPATIONAL THERAPY INITIAL EVALUATION ADULT - MODIFIED CLINICAL TEST OF SENSORY INTEGRATION IN BALANCE TEST
static sitting at the edge of bed with contact guard assistance; dynamic standing with minimum assistance x1 static sitting at the edge of bed with contact guard assistance; dynamic standing with minimum assistance x 1

## 2019-12-02 NOTE — OCCUPATIONAL THERAPY INITIAL EVALUATION ADULT - LIGHT TOUCH SENSATION, RLE, REHAB EVAL
unable to assess due to pt with difficulty following commands of assessment due to decreased cognition unable to assess as pt with difficulty following commands of assessment due to decreased cognition

## 2019-12-02 NOTE — OCCUPATIONAL THERAPY INITIAL EVALUATION ADULT - VISUAL ACUITY
unable to assess due to pt with difficulty following commands due to decreased cognition; pt wife reports pt has macular degeneration unable to assess as pt with difficulty following commands due to decreased cognition; pt wife reports pt has macular degeneration

## 2019-12-02 NOTE — OCCUPATIONAL THERAPY INITIAL EVALUATION ADULT - SENSORY TESTS
unable to assess due to pt with difficulty following commands due to decreased cognition unable to assess as pt with difficulty following commands of assessment due to decreased cognition

## 2019-12-02 NOTE — OCCUPATIONAL THERAPY INITIAL EVALUATION ADULT - NS ASR FOLLOW COMMAND OT EVAL
pt required increased time and repetition to process commands of tasks; pt requires constant redirection to attend to tasks; pt requires cues to sequence and problem solve tasks/mobility/unable to follow multi-step instructions/25% of the time/able to follow single-step instructions pt required increased time and repetition to process commands of tasks; pt requires constant redirection to attend to tasks; pt requires cues to sequence and problem solve tasks/mobility; pt with decreased motor planning requiring verbal/tactile cues/50% of the time/able to follow single-step instructions/unable to follow multi-step instructions

## 2019-12-02 NOTE — GOALS OF CARE CONVERSATION - ADVANCED CARE PLANNING - CONVERSATION DETAILS
Patient and family would like DNR  reviewed again the details of CPR and what intubation entails  they agree with DNR and DNI  will filled out MOLST  placed in chart
Discussed with patient regarding advanced directives. Wife thinks that perhaps patient was a DNR when they filled out the HCP paperwork but she is not sure. She will look through HCP paperwork and confirm if this is the case. In case of emergency she is the HCP and would like to be called for changes. Will follow up regarding advanced directives on 12/2

## 2019-12-02 NOTE — OCCUPATIONAL THERAPY INITIAL EVALUATION ADULT - ORIENTATION, REHAB EVAL
pt able to report the year as 2019 with cues/person/place person/pt able to report the year as 2019 with cues/choices/place

## 2019-12-02 NOTE — PROGRESS NOTE ADULT - ASSESSMENT
80 yo male with hx of Alzheimer's Dementia, HTN, CKD stage II who comes with a fall at home that does not seem to have been associated with tripping event as patient fell suddenly and was followed by a period of unresponsiveness and jerking movement of arms and legs, did not bite tongue or lose bowel/bladder continence.  Patient was a code stroke on admission with NIHSS of 10. Seen by neurology who feel patient had a seizure, and is now admitted for new onset seizure/work up.       1. New onset seizure  - CT head, CTA head/neck and CT C spine negative  - EEG pending   - Neuro consult appreciated, hold off on AED unless abnormal EEG  - Passed bedside swallow eval, will allow diet  - Aspiration precautions     2. Dementia with behavioral disturbance  - c/w memantine and quetiapine BID  - may need antipsychotics if starts to become more lucid overnight in unfamiliar setting  - would opt for haldol over benzodiazepine    3. HTN  - Controlled  - C/w Amlodipine    4. CKD II  - Stable    #DVT ppx  - SCD 80 yo male with hx of Alzheimer's Dementia, HTN, CKD stage II who comes with a fall at home that does not seem to have been associated with tripping event as patient fell suddenly and was followed by a period of unresponsiveness and jerking movement of arms and legs, did not bite tongue or lose bowel/bladder continence.  Patient was a code stroke on admission with NIHSS of 10. Seen by neurology who feel patient had a seizure, and is now admitted for new onset seizure/work up.       1. New onset seizure  - CT head, CTA head/neck and CT C spine negative  - EEG pending   - Neuro consult appreciated, hold off on AED unless abnormal EEG  - Passed bedside swallow eval, will allow diet  - Aspiration precautions   - PT/OT    2. Dementia with behavioral disturbance  - c/w memantine and quetiapine BID  - may need antipsychotics if starts to become more lucid overnight in unfamiliar setting  - would opt for haldol over benzodiazepine    3. HTN  - Controlled  - C/w Amlodipine    4. CKD II  - Stable    #DVT ppx  - SCD

## 2019-12-02 NOTE — OCCUPATIONAL THERAPY INITIAL EVALUATION ADULT - GENERAL OBSERVATIONS, REHAB EVAL
Pt received in semi-lynn position in bed +IV lock, +telemetry, +bed alarm (2nd position) wife at bedside. Pt agrees to OT. Pt received in semi-lynn position in bed +IV lock, +telemetry, +bed alarm (2nd position), wife at bedside. Pt agrees to OT.

## 2019-12-02 NOTE — OCCUPATIONAL THERAPY INITIAL EVALUATION ADULT - PERTINENT HX OF CURRENT PROBLEM, REHAB EVAL
pt with history of dementia presents to ED s/p fall with unresponsiveness and jerking movements of extremities; CT of brain shows no evidence of acute transcortical infarct, acute intracranial hemorrhage, or mass effect; CT of cervical spine shows no acute fracture or traumatic subluxation Pt with history of dementia presents to ED s/p fall with unresponsiveness and jerking movements of extremities. CT of brain shows no evidence of acute transcortical infarct, acute intracranial hemorrhage, or mass effect.

## 2019-12-03 ENCOUNTER — TRANSCRIPTION ENCOUNTER (OUTPATIENT)
Age: 79
End: 2019-12-03

## 2019-12-03 VITALS
TEMPERATURE: 98 F | HEART RATE: 54 BPM | OXYGEN SATURATION: 95 % | RESPIRATION RATE: 17 BRPM | SYSTOLIC BLOOD PRESSURE: 125 MMHG | DIASTOLIC BLOOD PRESSURE: 72 MMHG

## 2019-12-03 LAB
HCT VFR BLD CALC: 33.2 % — LOW (ref 39–50)
HGB BLD-MCNC: 11.3 G/DL — LOW (ref 13–17)
MCHC RBC-ENTMCNC: 31.4 PG — SIGNIFICANT CHANGE UP (ref 27–34)
MCHC RBC-ENTMCNC: 34 GM/DL — SIGNIFICANT CHANGE UP (ref 32–36)
MCV RBC AUTO: 92.2 FL — SIGNIFICANT CHANGE UP (ref 80–100)
PLATELET # BLD AUTO: 144 K/UL — LOW (ref 150–400)
RBC # BLD: 3.6 M/UL — LOW (ref 4.2–5.8)
RBC # FLD: 12.9 % — SIGNIFICANT CHANGE UP (ref 10.3–14.5)
VIT B12 SERPL-MCNC: 674 PG/ML — SIGNIFICANT CHANGE UP (ref 232–1245)
WBC # BLD: 4.28 K/UL — SIGNIFICANT CHANGE UP (ref 3.8–10.5)
WBC # FLD AUTO: 4.28 K/UL — SIGNIFICANT CHANGE UP (ref 3.8–10.5)

## 2019-12-03 PROCEDURE — 83036 HEMOGLOBIN GLYCOSYLATED A1C: CPT

## 2019-12-03 PROCEDURE — 85730 THROMBOPLASTIN TIME PARTIAL: CPT

## 2019-12-03 PROCEDURE — 70498 CT ANGIOGRAPHY NECK: CPT

## 2019-12-03 PROCEDURE — 85027 COMPLETE CBC AUTOMATED: CPT

## 2019-12-03 PROCEDURE — 99291 CRITICAL CARE FIRST HOUR: CPT | Mod: 25

## 2019-12-03 PROCEDURE — 97116 GAIT TRAINING THERAPY: CPT

## 2019-12-03 PROCEDURE — 70496 CT ANGIOGRAPHY HEAD: CPT

## 2019-12-03 PROCEDURE — 70450 CT HEAD/BRAIN W/O DYE: CPT

## 2019-12-03 PROCEDURE — 95819 EEG AWAKE AND ASLEEP: CPT

## 2019-12-03 PROCEDURE — 85610 PROTHROMBIN TIME: CPT

## 2019-12-03 PROCEDURE — 96374 THER/PROPH/DIAG INJ IV PUSH: CPT | Mod: XU

## 2019-12-03 PROCEDURE — 84484 ASSAY OF TROPONIN QUANT: CPT

## 2019-12-03 PROCEDURE — 97530 THERAPEUTIC ACTIVITIES: CPT

## 2019-12-03 PROCEDURE — 99239 HOSP IP/OBS DSCHRG MGMT >30: CPT

## 2019-12-03 PROCEDURE — 97167 OT EVAL HIGH COMPLEX 60 MIN: CPT

## 2019-12-03 PROCEDURE — 36415 COLL VENOUS BLD VENIPUNCTURE: CPT

## 2019-12-03 PROCEDURE — 99232 SBSQ HOSP IP/OBS MODERATE 35: CPT

## 2019-12-03 PROCEDURE — 97163 PT EVAL HIGH COMPLEX 45 MIN: CPT

## 2019-12-03 PROCEDURE — 82962 GLUCOSE BLOOD TEST: CPT

## 2019-12-03 PROCEDURE — 80061 LIPID PANEL: CPT

## 2019-12-03 PROCEDURE — 72125 CT NECK SPINE W/O DYE: CPT

## 2019-12-03 PROCEDURE — 80048 BASIC METABOLIC PNL TOTAL CA: CPT

## 2019-12-03 PROCEDURE — 97535 SELF CARE MNGMENT TRAINING: CPT

## 2019-12-03 PROCEDURE — 93005 ELECTROCARDIOGRAM TRACING: CPT

## 2019-12-03 PROCEDURE — 80053 COMPREHEN METABOLIC PANEL: CPT

## 2019-12-03 PROCEDURE — 71045 X-RAY EXAM CHEST 1 VIEW: CPT

## 2019-12-03 PROCEDURE — 82607 VITAMIN B-12: CPT

## 2019-12-03 PROCEDURE — 81001 URINALYSIS AUTO W/SCOPE: CPT

## 2019-12-03 RX ADMIN — Medication 100 MILLIGRAM(S): at 13:55

## 2019-12-03 RX ADMIN — MEMANTINE HYDROCHLORIDE 10 MILLIGRAM(S): 10 TABLET ORAL at 17:52

## 2019-12-03 RX ADMIN — QUETIAPINE FUMARATE 25 MILLIGRAM(S): 200 TABLET, FILM COATED ORAL at 17:52

## 2019-12-03 RX ADMIN — Medication 81 MILLIGRAM(S): at 13:55

## 2019-12-03 RX ADMIN — AMLODIPINE BESYLATE 5 MILLIGRAM(S): 2.5 TABLET ORAL at 05:47

## 2019-12-03 RX ADMIN — QUETIAPINE FUMARATE 25 MILLIGRAM(S): 200 TABLET, FILM COATED ORAL at 05:47

## 2019-12-03 RX ADMIN — MEMANTINE HYDROCHLORIDE 10 MILLIGRAM(S): 10 TABLET ORAL at 05:47

## 2019-12-03 NOTE — PROGRESS NOTE ADULT - ASSESSMENT
79y Male with baseline dementia now status post fall with possible seizure.  Presentation was not suggestive of stroke.     ? Seizure  Unclear if this was a simple mechanical fall with head injury or if patient had a seizure.    Hold of on antiepileptic drug for now.   EEG negative.   Mobilize with physical therapy.     Dementia  Continue Memantine.

## 2019-12-03 NOTE — DISCHARGE NOTE PROVIDER - HOSPITAL COURSE
Patient is a 82 yo male with hx of Alzheimer's Dementia, HTN, CKD stage II who comes with a fall at home that does not seem to have been associated with tripping event as patient fell suddenly and it was followed by a period of unresponsiveness and jerking movement of patient's arms and legs, did not bite tongue or lose bowel/bladder continence. Patient did hit his head on the fall. Patient started to become awake in the ED but was confused. He received 1 dose of midazolam in the ED. Patient at time of evaluation was lethargic and sleeping, difficult to keep awake. History obtained by family members at bedside.        Patient was admitted to SDU and seen in consultation with neurology. Patient had EEG performed which showed ___. Neurology felt more consistent with seizure than stroke given no focal neurological symptomatology. Patient had CT head that showed no bleed or mass.        Vital Signs Last 24 Hrs    T(C): 37.1 (03 Dec 2019 00:25), Max: 37.1 (03 Dec 2019 00:25)    T(F): 98.7 (03 Dec 2019 00:25), Max: 98.7 (03 Dec 2019 00:25)    HR: 78 (03 Dec 2019 05:46) (65 - 88)    BP: 130/63 (03 Dec 2019 05:46) (130/63 - 163/66)    BP(mean): 75 (02 Dec 2019 13:30) (75 - 75)    RR: 18 (03 Dec 2019 00:25) (18 - 18)    SpO2: 93% (03 Dec 2019 00:25) (93% - 99%)        PHYSICAL EXAM:    General: Well developed; well nourished; in no acute distress    Eyes: PERRLA, EOMI; conjunctiva and sclera clear    Head: Normocephalic; atraumatic    ENMT: No nasal discharge; airway clear    Neck: Supple; non tender; no masses    Respiratory: No wheezes, rales or rhonchi    Cardiovascular: Regular rate and rhythm. S1 and S2 Normal; No murmurs, gallops or rubs    Gastrointestinal: Soft non-tender non-distended; Normal bowel sounds    Extremities: Normal range of motion, No clubbing, cyanosis or edema    Neurological: Alert or but poorly oriented; moves extremities freely, no slurred speech, pupils equal, equal tone throughout Patient is a 82 yo male with hx of Alzheimer's Dementia, HTN, CKD stage II who comes with a fall at home that does not seem to have been associated with tripping event as patient fell suddenly and it was followed by a period of unresponsiveness and jerking movement of patient's arms and legs, did not bite tongue or lose bowel/bladder continence. Patient did hit his head on the fall. Patient started to become awake in the ED but was confused. He received 1 dose of midazolam in the ED. Patient at time of evaluation was lethargic and sleeping, difficult to keep awake. History obtained by family members at bedside.        Patient was admitted to SDU and seen in consultation with neurology. Patient had EEG performed which showed no epileptiform activity. Neurology felt more consistent with seizure than stroke given no focal neurological symptomatology. Patient had CT head that showed no bleed or mass.        Vital Signs Last 24 Hrs    T(C): 37.1 (03 Dec 2019 00:25), Max: 37.1 (03 Dec 2019 00:25)    T(F): 98.7 (03 Dec 2019 00:25), Max: 98.7 (03 Dec 2019 00:25)    HR: 78 (03 Dec 2019 05:46) (65 - 88)    BP: 130/63 (03 Dec 2019 05:46) (130/63 - 163/66)    BP(mean): 75 (02 Dec 2019 13:30) (75 - 75)    RR: 18 (03 Dec 2019 00:25) (18 - 18)    SpO2: 93% (03 Dec 2019 00:25) (93% - 99%)        PHYSICAL EXAM:    General: Well developed; well nourished; in no acute distress    Eyes: PERRLA, EOMI; conjunctiva and sclera clear    Head: Normocephalic; atraumatic    ENMT: No nasal discharge; airway clear    Neck: Supple; non tender; no masses    Respiratory: No wheezes, rales or rhonchi    Cardiovascular: Regular rate and rhythm. S1 and S2 Normal; No murmurs, gallops or rubs    Gastrointestinal: Soft non-tender non-distended; Normal bowel sounds    Extremities: Normal range of motion, No clubbing, cyanosis or edema    Neurological: Alert or but poorly oriented; moves extremities freely, no slurred speech, pupils equal, equal tone throughout        discharge time 38 minutes

## 2019-12-03 NOTE — DISCHARGE NOTE PROVIDER - NSDCHHHOMEBOUND_GEN_ALL_CORE
Chest pain/weakness during/after ambulation   greater than 20 feet/Requires supervison due to deteriorating mental status...

## 2019-12-03 NOTE — DISCHARGE NOTE PROVIDER - NSDCCPCAREPLAN_GEN_ALL_CORE_FT
PRINCIPAL DISCHARGE DIAGNOSIS  Diagnosis: Seizure  Assessment and Plan of Treatment: follow up with neurology. No further events during hospitalizations.      SECONDARY DISCHARGE DIAGNOSES  Diagnosis: Alzheimer's dementia  Assessment and Plan of Treatment: continue quetiapine

## 2019-12-03 NOTE — DISCHARGE NOTE PROVIDER - NSDCMRMEDTOKEN_GEN_ALL_CORE_FT
allopurinol 100 mg oral tablet: 1 tab(s) orally once a day  amLODIPine 5 mg oral tablet: 1 tab(s) orally once a day  aspirin 81 mg oral tablet: 1 tab(s) orally once a day  memantine 10 mg oral tablet: 1 tab(s) orally 2 times a day  pravastatin 20 mg oral tablet: 1 tab(s) orally once a day  QUEtiapine 25 mg oral tablet: 1 tab(s) orally 2 times a day

## 2019-12-03 NOTE — PROGRESS NOTE ADULT - SUBJECTIVE AND OBJECTIVE BOX
[facility]                                        Neurology at [place]                                 [facility]                                  [address]                                        [place], [place], [address]                                             [phone]        CC: head injury and possible seizure. Dementia.     HPI:   The patient is a 79y Male with a history of Alzheimer's dementia who is known to me from the office.   He has been on Memantine 10 mg BID.   He was hospitalized at [facility] in October of 2019 and Seroquel was added.     He now presented after a fall. His wife reports that they were leaving the house and he fell and hit his head. She noted some shaking and felt that he may have had some drooping of the left side of the mouth. He has been poorly responsive since.   In the ER he was agitated and received Versed.     Stroke code was reportedly activated.   I was called by the ER at 11:45 am the morning of arrival.     At baseline the patient is alert and able to speak although memory is 0/3 @ 5 minutes. He ambulates without an assistive device.       Interim history:  Now on 5 [place].   No new symptoms.     ROS:   Denies headache or dizziness.  Denies chest pain.  Denies shortness of breath.    MEDICATIONS  (STANDING):  allopurinol 100 milliGRAM(s) Oral daily  amLODIPine   Tablet 5 milliGRAM(s) Oral daily  aspirin enteric coated 81 milliGRAM(s) Oral daily  atorvastatin 40 milliGRAM(s) Oral at bedtime  memantine 10 milliGRAM(s) Oral two times a day  QUEtiapine 25 milliGRAM(s) Oral two times a day      Vital Signs Last 24 Hrs  T(C): 36.7 (03 Dec 2019 09:11), Max: 37.1 (03 Dec 2019 00:25)  T(F): 98.1 (03 Dec 2019 09:11), Max: 98.7 (03 Dec 2019 00:25)  HR: 74 (03 Dec 2019 09:11) (65 - 88)  BP: 113/62 (03 Dec 2019 09:11) (113/62 - 163/66)  BP(mean): 75 (02 Dec 2019 13:30) (75 - 75)  RR: 18 (03 Dec 2019 09:11) (18 - 18)  SpO2: 95% (03 Dec 2019 09:11) (93% - 99%)    Detailed Neurologic Exam:    Mental status: The patient is awake and alert. He answers simple questions and follows simple instructions. Memory is poor.     Cranial nerves: Pupils equal and react symmetrically to light. There is no visual field deficit to threat. Extraocular motion is full with no nystagmus. There is no ptosis. Facial sensation is intact. Facial musculature is symmetric. Palate elevates symmetrically. Tongue is midline.    Motor: There is normal bulk and tone.  There is no tremor.  Strength grossly 5/5 bilaterally.    Sensation: Grossly intact to light touch and pin.    Reflexes: 2+ throughout and plantar responses are flexor.    Cerebellar: No dysmetria on finger nose testing.  Labs:     12-02    141  |  107  |  20.0  ----------------------------<  94  3.7   |  21.0<L>  |  1.28    Ca    9.0      02 Dec 2019 06:02                              11.3   4.28  )-----------( 144      ( 03 Dec 2019 08:18 )             33.2       Rad:   EEG slow but no epileptiform activity.

## 2019-12-03 NOTE — DISCHARGE NOTE PROVIDER - CARE PROVIDER_API CALL
Kunal Starks)  Neurology; Vascular Neurology  [address] [facility] [address] [facility] [address]  [place] [address]  Phone: [phone]  Fax: [phone]  Follow Up Time: 1 week

## 2019-12-04 DIAGNOSIS — Z71.89 OTHER SPECIFIED COUNSELING: ICD-10-CM

## 2019-12-04 PROBLEM — N18.2 CHRONIC KIDNEY DISEASE, STAGE 2 (MILD): Chronic | Status: ACTIVE | Noted: 2019-12-01

## 2019-12-04 PROBLEM — I10 ESSENTIAL (PRIMARY) HYPERTENSION: Chronic | Status: ACTIVE | Noted: 2019-12-01

## 2019-12-04 PROBLEM — G30.9 ALZHEIMER'S DISEASE, UNSPECIFIED: Chronic | Status: ACTIVE | Noted: 2019-12-01

## 2019-12-05 ENCOUNTER — APPOINTMENT (OUTPATIENT)
Dept: NEUROLOGY | Facility: CLINIC | Age: 79
End: 2019-12-05
Payer: MEDICARE

## 2019-12-05 VITALS
SYSTOLIC BLOOD PRESSURE: 152 MMHG | HEIGHT: 72 IN | DIASTOLIC BLOOD PRESSURE: 60 MMHG | BODY MASS INDEX: 25.6 KG/M2 | WEIGHT: 189 LBS

## 2019-12-05 PROCEDURE — 99213 OFFICE O/P EST LOW 20 MIN: CPT

## 2019-12-05 NOTE — CONSULT LETTER
[Courtesy Letter:] : I had the pleasure of seeing your patient, [unfilled], in my office today. [Dear  ___] : Dear  [unfilled], [Consult Closing:] : Thank you very much for allowing me to participate in the care of this patient.  If you have any questions, please do not hesitate to contact me. [Please see my note below.] : Please see my note below. [Sincerely,] : Sincerely, [FreeTextEntry3] : Kunal Starks MD.

## 2019-12-05 NOTE — DATA REVIEWED
[de-identified] : EEG at [facility] in December of 2019 demonstrated only degenerative slowing. [de-identified] : CT scan of the head from 10/15/14 demonstrated only some mild atrophy. This was performed at [facility].\par \par B12 and thyroid function in the past have been normal. RPR was nonreactive.\par \par

## 2019-12-05 NOTE — ASSESSMENT
[FreeTextEntry1] : This is a 79 year-old man with Alzheimer's type dementia. \par \par I will continue his current regimen of Namenda and Seroquel.\par \par I will see him back in 4 months.

## 2019-12-05 NOTE — HISTORY OF PRESENT ILLNESS
[FreeTextEntry1] : I saw this patient in the office today.\par \par As you recall he has a history of dementia.\par His difficulty has been more for short-term memory than long-term memory.\par He began having memory difficulty around 2013. I had first seen him for this in 2014. He had already been started on Exelon a week or so before his visit here.\par His memory difficulty gradually progressed. I had added Namenda.\par The cognitive difficulties had persisted ever since.\par \par His wife had reported that he is started to have difficulty with activities of daily living including dressing and hygiene.\par \par He is now off Exelon as his insurance for longer covered it.\par \par I had seen him at [facility] on 12/1/19.\par He had presented after a fall.\par EEG was unremarkable.\par \par

## 2019-12-05 NOTE — PHYSICAL EXAM
[General Appearance - Alert] : alert [General Appearance - In No Acute Distress] : in no acute distress [Oriented To Time, Place, And Person] : oriented to person, place, and time [Affect] : the affect was normal [Cranial Nerves Optic (II)] : visual acuity intact bilaterally,  visual fields full to confrontation, pupils equal round and reactive to light [Recall ___ / 3] : recall [unfilled] / 3 [Cranial Nerves Oculomotor (III)] : extraocular motion intact [Cranial Nerves Facial (VII)] : face symmetrical [Cranial Nerves Trigeminal (V)] : facial sensation intact symmetrically [Cranial Nerves Hypoglossal (XII)] : there was no tongue deviation with protrusion [Cranial Nerves Glossopharyngeal (IX)] : tongue and palate midline [Cranial Nerves Accessory (XI - Cranial And Spinal)] : head turning and shoulder shrug symmetric [Cranial Nerves Vestibulocochlear (VIII)] : hearing was intact bilaterally [Motor Tone] : muscle tone was normal in all four extremities [Motor Strength] : muscle strength was normal in all four extremities [Sensation Tactile Decrease] : light touch was intact [Sensation Pain / Temperature Decrease] : pain and temperature was intact [Sensation Vibration Decrease] : vibration was intact [Abnormal Walk] : normal gait [2+] : Patella left 2+ [Optic Disc Abnormality] : the optic disc were normal in size and color [Edema] : there was no peripheral edema [Involuntary Movements] : no involuntary movements were seen [Dysarthria] : no dysarthria [Coordination - Dysmetria Impaired Finger-to-Nose Bilateral] : not present [Plantar Reflex Right Only] : normal on the right [Romberg's Sign] : Romberg's sign was negtive [Plantar Reflex Left Only] : normal on the left

## 2020-01-01 ENCOUNTER — INPATIENT (INPATIENT)
Facility: HOSPITAL | Age: 80
LOS: 14 days | Discharge: HOSPICE | DRG: 177 | End: 2020-12-24
Attending: STUDENT IN AN ORGANIZED HEALTH CARE EDUCATION/TRAINING PROGRAM | Admitting: INTERNAL MEDICINE
Payer: MEDICARE

## 2020-01-01 ENCOUNTER — NON-APPOINTMENT (OUTPATIENT)
Age: 80
End: 2020-01-01

## 2020-01-01 ENCOUNTER — APPOINTMENT (OUTPATIENT)
Dept: NEUROLOGY | Facility: CLINIC | Age: 80
End: 2020-01-01
Payer: MEDICARE

## 2020-01-01 ENCOUNTER — TRANSCRIPTION ENCOUNTER (OUTPATIENT)
Age: 80
End: 2020-01-01

## 2020-01-01 ENCOUNTER — EMERGENCY (EMERGENCY)
Facility: HOSPITAL | Age: 80
LOS: 1 days | Discharge: HOME | End: 2020-01-01
Attending: STUDENT IN AN ORGANIZED HEALTH CARE EDUCATION/TRAINING PROGRAM
Payer: MEDICARE

## 2020-01-01 VITALS
HEART RATE: 85 BPM | DIASTOLIC BLOOD PRESSURE: 59 MMHG | OXYGEN SATURATION: 99 % | RESPIRATION RATE: 20 BRPM | TEMPERATURE: 98 F | SYSTOLIC BLOOD PRESSURE: 119 MMHG

## 2020-01-01 VITALS
OXYGEN SATURATION: 93 % | HEART RATE: 103 BPM | RESPIRATION RATE: 17 BRPM | HEIGHT: 72 IN | WEIGHT: 188.05 LBS | TEMPERATURE: 98 F

## 2020-01-01 VITALS
TEMPERATURE: 98.1 F | HEIGHT: 72 IN | WEIGHT: 188 LBS | DIASTOLIC BLOOD PRESSURE: 70 MMHG | BODY MASS INDEX: 25.47 KG/M2 | SYSTOLIC BLOOD PRESSURE: 120 MMHG

## 2020-01-01 VITALS
HEART RATE: 83 BPM | DIASTOLIC BLOOD PRESSURE: 79 MMHG | OXYGEN SATURATION: 99 % | TEMPERATURE: 98 F | SYSTOLIC BLOOD PRESSURE: 144 MMHG

## 2020-01-01 VITALS
HEIGHT: 72 IN | WEIGHT: 188 LBS | BODY MASS INDEX: 25.47 KG/M2 | DIASTOLIC BLOOD PRESSURE: 70 MMHG | SYSTOLIC BLOOD PRESSURE: 118 MMHG

## 2020-01-01 VITALS
BODY MASS INDEX: 25.47 KG/M2 | HEIGHT: 72 IN | TEMPERATURE: 97.6 F | WEIGHT: 188 LBS | DIASTOLIC BLOOD PRESSURE: 70 MMHG | SYSTOLIC BLOOD PRESSURE: 120 MMHG

## 2020-01-01 VITALS
TEMPERATURE: 98 F | OXYGEN SATURATION: 97 % | HEART RATE: 81 BPM | DIASTOLIC BLOOD PRESSURE: 71 MMHG | RESPIRATION RATE: 18 BRPM | SYSTOLIC BLOOD PRESSURE: 137 MMHG

## 2020-01-01 DIAGNOSIS — R13.10 DYSPHAGIA, UNSPECIFIED: ICD-10-CM

## 2020-01-01 DIAGNOSIS — Z98.890 OTHER SPECIFIED POSTPROCEDURAL STATES: Chronic | ICD-10-CM

## 2020-01-01 DIAGNOSIS — S09.90XA UNSPECIFIED INJURY OF HEAD, INITIAL ENCOUNTER: ICD-10-CM

## 2020-01-01 DIAGNOSIS — Z51.5 ENCOUNTER FOR PALLIATIVE CARE: ICD-10-CM

## 2020-01-01 DIAGNOSIS — U07.1 COVID-19: ICD-10-CM

## 2020-01-01 DIAGNOSIS — F03.90 UNSPECIFIED DEMENTIA WITHOUT BEHAVIORAL DISTURBANCE: ICD-10-CM

## 2020-01-01 DIAGNOSIS — G40.909 EPILEPSY, UNSPECIFIED, NOT INTRACTABLE, W/OUT STATUS EPILEPTICUS: ICD-10-CM

## 2020-01-01 DIAGNOSIS — F02.80 ALZHEIMER'S DISEASE, UNSPECIFIED: ICD-10-CM

## 2020-01-01 DIAGNOSIS — G30.9 ALZHEIMER'S DISEASE, UNSPECIFIED: ICD-10-CM

## 2020-01-01 LAB
AGGLUTINATION: PRESENT — SIGNIFICANT CHANGE UP
ALBUMIN SERPL ELPH-MCNC: 3.2 G/DL — LOW (ref 3.3–5.2)
ALBUMIN SERPL ELPH-MCNC: 3.2 G/DL — LOW (ref 3.3–5.2)
ALBUMIN SERPL ELPH-MCNC: 3.6 G/DL — SIGNIFICANT CHANGE UP (ref 3.3–5.2)
ALBUMIN SERPL ELPH-MCNC: 3.9 G/DL — SIGNIFICANT CHANGE UP (ref 3.3–5.2)
ALP SERPL-CCNC: 66 U/L — SIGNIFICANT CHANGE UP (ref 40–120)
ALP SERPL-CCNC: 66 U/L — SIGNIFICANT CHANGE UP (ref 40–120)
ALP SERPL-CCNC: 68 U/L — SIGNIFICANT CHANGE UP (ref 40–120)
ALP SERPL-CCNC: 80 U/L — SIGNIFICANT CHANGE UP (ref 40–120)
ALT FLD-CCNC: 24 U/L — SIGNIFICANT CHANGE UP
ALT FLD-CCNC: 31 U/L — SIGNIFICANT CHANGE UP
ALT FLD-CCNC: 64 U/L — HIGH
ALT FLD-CCNC: 70 U/L — HIGH
ANION GAP SERPL CALC-SCNC: 11 MMOL/L — SIGNIFICANT CHANGE UP (ref 5–17)
ANION GAP SERPL CALC-SCNC: 15 MMOL/L — SIGNIFICANT CHANGE UP (ref 5–17)
ANION GAP SERPL CALC-SCNC: 18 MMOL/L — HIGH (ref 5–17)
ANION GAP SERPL CALC-SCNC: 8 MMOL/L — SIGNIFICANT CHANGE UP (ref 5–17)
ANION GAP SERPL CALC-SCNC: 9 MMOL/L — SIGNIFICANT CHANGE UP (ref 5–17)
ANISOCYTOSIS BLD QL: SLIGHT — SIGNIFICANT CHANGE UP
APPEARANCE UR: CLEAR — SIGNIFICANT CHANGE UP
AST SERPL-CCNC: 158 U/L — HIGH
AST SERPL-CCNC: 32 U/L — SIGNIFICANT CHANGE UP
AST SERPL-CCNC: 47 U/L — HIGH
AST SERPL-CCNC: 98 U/L — HIGH
BACTERIA # UR AUTO: ABNORMAL
BASOPHILS # BLD AUTO: 0 K/UL — SIGNIFICANT CHANGE UP (ref 0–0.2)
BASOPHILS # BLD AUTO: 0.02 K/UL — SIGNIFICANT CHANGE UP (ref 0–0.2)
BASOPHILS # BLD AUTO: 0.03 K/UL — SIGNIFICANT CHANGE UP (ref 0–0.2)
BASOPHILS NFR BLD AUTO: 0 % — SIGNIFICANT CHANGE UP (ref 0–2)
BASOPHILS NFR BLD AUTO: 0.7 % — SIGNIFICANT CHANGE UP (ref 0–2)
BASOPHILS NFR BLD AUTO: 0.9 % — SIGNIFICANT CHANGE UP (ref 0–2)
BILIRUB SERPL-MCNC: 0.5 MG/DL — SIGNIFICANT CHANGE UP (ref 0.4–2)
BILIRUB SERPL-MCNC: 0.6 MG/DL — SIGNIFICANT CHANGE UP (ref 0.4–2)
BILIRUB SERPL-MCNC: 0.7 MG/DL — SIGNIFICANT CHANGE UP (ref 0.4–2)
BILIRUB SERPL-MCNC: 0.8 MG/DL — SIGNIFICANT CHANGE UP (ref 0.4–2)
BILIRUB UR-MCNC: ABNORMAL
BUN SERPL-MCNC: 29 MG/DL — HIGH (ref 8–20)
BUN SERPL-MCNC: 29 MG/DL — HIGH (ref 8–20)
BUN SERPL-MCNC: 35 MG/DL — HIGH (ref 8–20)
BUN SERPL-MCNC: 45 MG/DL — HIGH (ref 8–20)
BUN SERPL-MCNC: 45 MG/DL — HIGH (ref 8–20)
BUN SERPL-MCNC: 47 MG/DL — HIGH (ref 8–20)
BUN SERPL-MCNC: 51 MG/DL — HIGH (ref 8–20)
BUN SERPL-MCNC: 55 MG/DL — HIGH (ref 8–20)
CALCIUM SERPL-MCNC: 8.3 MG/DL — LOW (ref 8.6–10.2)
CALCIUM SERPL-MCNC: 8.5 MG/DL — LOW (ref 8.6–10.2)
CALCIUM SERPL-MCNC: 8.6 MG/DL — SIGNIFICANT CHANGE UP (ref 8.6–10.2)
CALCIUM SERPL-MCNC: 8.6 MG/DL — SIGNIFICANT CHANGE UP (ref 8.6–10.2)
CALCIUM SERPL-MCNC: 8.7 MG/DL — SIGNIFICANT CHANGE UP (ref 8.6–10.2)
CALCIUM SERPL-MCNC: 8.8 MG/DL — SIGNIFICANT CHANGE UP (ref 8.6–10.2)
CALCIUM SERPL-MCNC: 8.8 MG/DL — SIGNIFICANT CHANGE UP (ref 8.6–10.2)
CALCIUM SERPL-MCNC: 9.4 MG/DL — SIGNIFICANT CHANGE UP (ref 8.6–10.2)
CHLORIDE SERPL-SCNC: 102 MMOL/L — SIGNIFICANT CHANGE UP (ref 98–107)
CHLORIDE SERPL-SCNC: 109 MMOL/L — HIGH (ref 98–107)
CHLORIDE SERPL-SCNC: 112 MMOL/L — HIGH (ref 98–107)
CHLORIDE SERPL-SCNC: 113 MMOL/L — HIGH (ref 98–107)
CHLORIDE SERPL-SCNC: 113 MMOL/L — HIGH (ref 98–107)
CHLORIDE SERPL-SCNC: 118 MMOL/L — HIGH (ref 98–107)
CHLORIDE SERPL-SCNC: 118 MMOL/L — HIGH (ref 98–107)
CHLORIDE SERPL-SCNC: 119 MMOL/L — HIGH (ref 98–107)
CO2 SERPL-SCNC: 17 MMOL/L — LOW (ref 22–29)
CO2 SERPL-SCNC: 18 MMOL/L — LOW (ref 22–29)
CO2 SERPL-SCNC: 19 MMOL/L — LOW (ref 22–29)
CO2 SERPL-SCNC: 20 MMOL/L — LOW (ref 22–29)
CO2 SERPL-SCNC: 21 MMOL/L — LOW (ref 22–29)
CO2 SERPL-SCNC: 21 MMOL/L — LOW (ref 22–29)
CO2 SERPL-SCNC: 23 MMOL/L — SIGNIFICANT CHANGE UP (ref 22–29)
CO2 SERPL-SCNC: 23 MMOL/L — SIGNIFICANT CHANGE UP (ref 22–29)
COLOR SPEC: YELLOW — SIGNIFICANT CHANGE UP
COMMENT - URINE: SIGNIFICANT CHANGE UP
CREAT SERPL-MCNC: 1.23 MG/DL — SIGNIFICANT CHANGE UP (ref 0.5–1.3)
CREAT SERPL-MCNC: 1.28 MG/DL — SIGNIFICANT CHANGE UP (ref 0.5–1.3)
CREAT SERPL-MCNC: 1.28 MG/DL — SIGNIFICANT CHANGE UP (ref 0.5–1.3)
CREAT SERPL-MCNC: 1.35 MG/DL — HIGH (ref 0.5–1.3)
CREAT SERPL-MCNC: 1.52 MG/DL — HIGH (ref 0.5–1.3)
CREAT SERPL-MCNC: 1.56 MG/DL — HIGH (ref 0.5–1.3)
CREAT SERPL-MCNC: 1.74 MG/DL — HIGH (ref 0.5–1.3)
CREAT SERPL-MCNC: 1.84 MG/DL — HIGH (ref 0.5–1.3)
CRP SERPL-MCNC: 0.97 MG/DL — HIGH (ref 0–0.4)
CULTURE RESULTS: SIGNIFICANT CHANGE UP
CULTURE RESULTS: SIGNIFICANT CHANGE UP
DIFF PNL FLD: ABNORMAL
ELLIPTOCYTES BLD QL SMEAR: SLIGHT — SIGNIFICANT CHANGE UP
EOSINOPHIL # BLD AUTO: 0 K/UL — SIGNIFICANT CHANGE UP (ref 0–0.5)
EOSINOPHIL # BLD AUTO: 0.02 K/UL — SIGNIFICANT CHANGE UP (ref 0–0.5)
EOSINOPHIL # BLD AUTO: 0.04 K/UL — SIGNIFICANT CHANGE UP (ref 0–0.5)
EOSINOPHIL NFR BLD AUTO: 0 % — SIGNIFICANT CHANGE UP (ref 0–6)
EOSINOPHIL NFR BLD AUTO: 0.9 % — SIGNIFICANT CHANGE UP (ref 0–6)
EOSINOPHIL NFR BLD AUTO: 0.9 % — SIGNIFICANT CHANGE UP (ref 0–6)
EPI CELLS # UR: SIGNIFICANT CHANGE UP
FERRITIN SERPL-MCNC: 357 NG/ML — SIGNIFICANT CHANGE UP (ref 30–400)
FERRITIN SERPL-MCNC: 669 NG/ML — HIGH (ref 30–400)
FERRITIN SERPL-MCNC: 736 NG/ML — HIGH (ref 30–400)
GIANT PLATELETS BLD QL SMEAR: PRESENT — SIGNIFICANT CHANGE UP
GLUCOSE BLDC GLUCOMTR-MCNC: 114 MG/DL — HIGH (ref 70–99)
GLUCOSE BLDC GLUCOMTR-MCNC: 128 MG/DL — HIGH (ref 70–99)
GLUCOSE BLDC GLUCOMTR-MCNC: 132 MG/DL — HIGH (ref 70–99)
GLUCOSE BLDC GLUCOMTR-MCNC: 166 MG/DL — HIGH (ref 70–99)
GLUCOSE BLDC GLUCOMTR-MCNC: 169 MG/DL — HIGH (ref 70–99)
GLUCOSE SERPL-MCNC: 109 MG/DL — HIGH (ref 70–99)
GLUCOSE SERPL-MCNC: 110 MG/DL — HIGH (ref 70–99)
GLUCOSE SERPL-MCNC: 116 MG/DL — HIGH (ref 70–99)
GLUCOSE SERPL-MCNC: 134 MG/DL — HIGH (ref 70–99)
GLUCOSE SERPL-MCNC: 148 MG/DL — HIGH (ref 70–99)
GLUCOSE SERPL-MCNC: 151 MG/DL — HIGH (ref 70–99)
GLUCOSE SERPL-MCNC: 170 MG/DL — HIGH (ref 70–99)
GLUCOSE SERPL-MCNC: 99 MG/DL — SIGNIFICANT CHANGE UP (ref 70–99)
GLUCOSE UR QL: NEGATIVE MG/DL — SIGNIFICANT CHANGE UP
GRAN CASTS # UR COMP ASSIST: ABNORMAL /LPF
HCT VFR BLD CALC: 33.9 % — LOW (ref 39–50)
HCT VFR BLD CALC: 34 % — LOW (ref 39–50)
HCT VFR BLD CALC: 34.8 % — LOW (ref 39–50)
HCT VFR BLD CALC: 36 % — LOW (ref 39–50)
HCT VFR BLD CALC: 36.4 % — LOW (ref 39–50)
HGB BLD-MCNC: 11.6 G/DL — LOW (ref 13–17)
HGB BLD-MCNC: 11.6 G/DL — LOW (ref 13–17)
HGB BLD-MCNC: 12 G/DL — LOW (ref 13–17)
HGB BLD-MCNC: 12.5 G/DL — LOW (ref 13–17)
HGB BLD-MCNC: 12.9 G/DL — LOW (ref 13–17)
HYALINE CASTS # UR AUTO: ABNORMAL /LPF
IMM GRANULOCYTES NFR BLD AUTO: 0.7 % — SIGNIFICANT CHANGE UP (ref 0–1.5)
KETONES UR-MCNC: ABNORMAL
LDH SERPL L TO P-CCNC: 527 U/L — HIGH (ref 98–192)
LDH SERPL L TO P-CCNC: 546 U/L — HIGH (ref 98–192)
LDH SERPL L TO P-CCNC: 619 U/L — HIGH (ref 98–192)
LEGIONELLA AG UR QL: NEGATIVE — SIGNIFICANT CHANGE UP
LEUKOCYTE ESTERASE UR-ACNC: ABNORMAL
LIDOCAIN IGE QN: 29 U/L — SIGNIFICANT CHANGE UP (ref 22–51)
LYMPHOCYTES # BLD AUTO: 0.17 K/UL — LOW (ref 1–3.3)
LYMPHOCYTES # BLD AUTO: 0.46 K/UL — LOW (ref 1–3.3)
LYMPHOCYTES # BLD AUTO: 1.06 K/UL — SIGNIFICANT CHANGE UP (ref 1–3.3)
LYMPHOCYTES # BLD AUTO: 2.6 % — LOW (ref 13–44)
LYMPHOCYTES # BLD AUTO: 24.4 % — SIGNIFICANT CHANGE UP (ref 13–44)
LYMPHOCYTES # BLD AUTO: 25.2 % — SIGNIFICANT CHANGE UP (ref 13–44)
MACROCYTES BLD QL: SLIGHT — SIGNIFICANT CHANGE UP
MAGNESIUM SERPL-MCNC: 1.9 MG/DL — SIGNIFICANT CHANGE UP (ref 1.6–2.6)
MAGNESIUM SERPL-MCNC: 2.1 MG/DL — SIGNIFICANT CHANGE UP (ref 1.6–2.6)
MAGNESIUM SERPL-MCNC: 2.2 MG/DL — SIGNIFICANT CHANGE UP (ref 1.6–2.6)
MAGNESIUM SERPL-MCNC: 2.3 MG/DL — SIGNIFICANT CHANGE UP (ref 1.6–2.6)
MAGNESIUM SERPL-MCNC: 2.3 MG/DL — SIGNIFICANT CHANGE UP (ref 1.8–2.6)
MAGNESIUM SERPL-MCNC: 2.4 MG/DL — SIGNIFICANT CHANGE UP (ref 1.6–2.6)
MAGNESIUM SERPL-MCNC: 2.4 MG/DL — SIGNIFICANT CHANGE UP (ref 1.6–2.6)
MANUAL SMEAR VERIFICATION: SIGNIFICANT CHANGE UP
MANUAL SMEAR VERIFICATION: SIGNIFICANT CHANGE UP
MCHC RBC-ENTMCNC: 31.5 PG — SIGNIFICANT CHANGE UP (ref 27–34)
MCHC RBC-ENTMCNC: 32.5 PG — SIGNIFICANT CHANGE UP (ref 27–34)
MCHC RBC-ENTMCNC: 32.7 PG — SIGNIFICANT CHANGE UP (ref 27–34)
MCHC RBC-ENTMCNC: 32.7 PG — SIGNIFICANT CHANGE UP (ref 27–34)
MCHC RBC-ENTMCNC: 33 PG — SIGNIFICANT CHANGE UP (ref 27–34)
MCHC RBC-ENTMCNC: 33.3 GM/DL — SIGNIFICANT CHANGE UP (ref 32–36)
MCHC RBC-ENTMCNC: 34.2 GM/DL — SIGNIFICANT CHANGE UP (ref 32–36)
MCHC RBC-ENTMCNC: 34.7 GM/DL — SIGNIFICANT CHANGE UP (ref 32–36)
MCHC RBC-ENTMCNC: 35.3 GM/DL — SIGNIFICANT CHANGE UP (ref 32–36)
MCHC RBC-ENTMCNC: 35.4 GM/DL — SIGNIFICANT CHANGE UP (ref 32–36)
MCV RBC AUTO: 92.6 FL — SIGNIFICANT CHANGE UP (ref 80–100)
MCV RBC AUTO: 93.1 FL — SIGNIFICANT CHANGE UP (ref 80–100)
MCV RBC AUTO: 94.2 FL — SIGNIFICANT CHANGE UP (ref 80–100)
MCV RBC AUTO: 94.6 FL — SIGNIFICANT CHANGE UP (ref 80–100)
MCV RBC AUTO: 95 FL — SIGNIFICANT CHANGE UP (ref 80–100)
METAMYELOCYTES # FLD: 0.9 % — HIGH (ref 0–0)
METAMYELOCYTES # FLD: 0.9 % — HIGH (ref 0–0)
MICROCYTES BLD QL: SLIGHT — SIGNIFICANT CHANGE UP
MONOCYTES # BLD AUTO: 0.35 K/UL — SIGNIFICANT CHANGE UP (ref 0–0.9)
MONOCYTES # BLD AUTO: 0.38 K/UL — SIGNIFICANT CHANGE UP (ref 0–0.9)
MONOCYTES # BLD AUTO: 0.45 K/UL — SIGNIFICANT CHANGE UP (ref 0–0.9)
MONOCYTES NFR BLD AUTO: 20.8 % — HIGH (ref 2–14)
MONOCYTES NFR BLD AUTO: 7.1 % — SIGNIFICANT CHANGE UP (ref 2–14)
MONOCYTES NFR BLD AUTO: 8.1 % — SIGNIFICANT CHANGE UP (ref 2–14)
MYELOCYTES NFR BLD: 0.9 % — HIGH (ref 0–0)
NEUTROPHILS # BLD AUTO: 0.85 K/UL — LOW (ref 1.8–7.4)
NEUTROPHILS # BLD AUTO: 2.83 K/UL — SIGNIFICANT CHANGE UP (ref 1.8–7.4)
NEUTROPHILS # BLD AUTO: 5.64 K/UL — SIGNIFICANT CHANGE UP (ref 1.8–7.4)
NEUTROPHILS NFR BLD AUTO: 46.1 % — SIGNIFICANT CHANGE UP (ref 43–77)
NEUTROPHILS NFR BLD AUTO: 65.2 % — SIGNIFICANT CHANGE UP (ref 43–77)
NEUTROPHILS NFR BLD AUTO: 84.1 % — HIGH (ref 43–77)
NEUTS BAND # BLD: 4.4 % — SIGNIFICANT CHANGE UP (ref 0–8)
NITRITE UR-MCNC: NEGATIVE — SIGNIFICANT CHANGE UP
OVALOCYTES BLD QL SMEAR: SLIGHT — SIGNIFICANT CHANGE UP
OVALOCYTES BLD QL SMEAR: SLIGHT — SIGNIFICANT CHANGE UP
PH UR: 5 — SIGNIFICANT CHANGE UP (ref 5–8)
PLAT MORPH BLD: NORMAL — SIGNIFICANT CHANGE UP
PLAT MORPH BLD: NORMAL — SIGNIFICANT CHANGE UP
PLATELET # BLD AUTO: 100 K/UL — LOW (ref 150–400)
PLATELET # BLD AUTO: 121 K/UL — LOW (ref 150–400)
PLATELET # BLD AUTO: 129 K/UL — LOW (ref 150–400)
PLATELET # BLD AUTO: 157 K/UL — SIGNIFICANT CHANGE UP (ref 150–400)
PLATELET # BLD AUTO: 173 K/UL — SIGNIFICANT CHANGE UP (ref 150–400)
POIKILOCYTOSIS BLD QL AUTO: SLIGHT — SIGNIFICANT CHANGE UP
POIKILOCYTOSIS BLD QL AUTO: SLIGHT — SIGNIFICANT CHANGE UP
POLYCHROMASIA BLD QL SMEAR: SLIGHT — SIGNIFICANT CHANGE UP
POLYCHROMASIA BLD QL SMEAR: SLIGHT — SIGNIFICANT CHANGE UP
POTASSIUM SERPL-MCNC: 3.9 MMOL/L — SIGNIFICANT CHANGE UP (ref 3.5–5.3)
POTASSIUM SERPL-MCNC: 3.9 MMOL/L — SIGNIFICANT CHANGE UP (ref 3.5–5.3)
POTASSIUM SERPL-MCNC: 4.1 MMOL/L — SIGNIFICANT CHANGE UP (ref 3.5–5.3)
POTASSIUM SERPL-MCNC: 4.2 MMOL/L — SIGNIFICANT CHANGE UP (ref 3.5–5.3)
POTASSIUM SERPL-MCNC: 4.3 MMOL/L — SIGNIFICANT CHANGE UP (ref 3.5–5.3)
POTASSIUM SERPL-MCNC: 4.3 MMOL/L — SIGNIFICANT CHANGE UP (ref 3.5–5.3)
POTASSIUM SERPL-MCNC: 4.5 MMOL/L — SIGNIFICANT CHANGE UP (ref 3.5–5.3)
POTASSIUM SERPL-MCNC: 4.7 MMOL/L — SIGNIFICANT CHANGE UP (ref 3.5–5.3)
POTASSIUM SERPL-SCNC: 3.9 MMOL/L — SIGNIFICANT CHANGE UP (ref 3.5–5.3)
POTASSIUM SERPL-SCNC: 3.9 MMOL/L — SIGNIFICANT CHANGE UP (ref 3.5–5.3)
POTASSIUM SERPL-SCNC: 4.1 MMOL/L — SIGNIFICANT CHANGE UP (ref 3.5–5.3)
POTASSIUM SERPL-SCNC: 4.2 MMOL/L — SIGNIFICANT CHANGE UP (ref 3.5–5.3)
POTASSIUM SERPL-SCNC: 4.3 MMOL/L — SIGNIFICANT CHANGE UP (ref 3.5–5.3)
POTASSIUM SERPL-SCNC: 4.3 MMOL/L — SIGNIFICANT CHANGE UP (ref 3.5–5.3)
POTASSIUM SERPL-SCNC: 4.5 MMOL/L — SIGNIFICANT CHANGE UP (ref 3.5–5.3)
POTASSIUM SERPL-SCNC: 4.7 MMOL/L — SIGNIFICANT CHANGE UP (ref 3.5–5.3)
PROCALCITONIN SERPL-MCNC: 0.13 NG/ML — HIGH (ref 0.02–0.1)
PROCALCITONIN SERPL-MCNC: 0.26 NG/ML — HIGH (ref 0.02–0.1)
PROT SERPL-MCNC: 6.4 G/DL — LOW (ref 6.6–8.7)
PROT SERPL-MCNC: 6.6 G/DL — SIGNIFICANT CHANGE UP (ref 6.6–8.7)
PROT SERPL-MCNC: 6.6 G/DL — SIGNIFICANT CHANGE UP (ref 6.6–8.7)
PROT SERPL-MCNC: 6.7 G/DL — SIGNIFICANT CHANGE UP (ref 6.6–8.7)
PROT UR-MCNC: 500 MG/DL
RAPID RVP RESULT: DETECTED
RBC # BLD: 3.57 M/UL — LOW (ref 4.2–5.8)
RBC # BLD: 3.67 M/UL — LOW (ref 4.2–5.8)
RBC # BLD: 3.68 M/UL — LOW (ref 4.2–5.8)
RBC # BLD: 3.82 M/UL — LOW (ref 4.2–5.8)
RBC # BLD: 3.91 M/UL — LOW (ref 4.2–5.8)
RBC # FLD: 11.9 % — SIGNIFICANT CHANGE UP (ref 10.3–14.5)
RBC # FLD: 11.9 % — SIGNIFICANT CHANGE UP (ref 10.3–14.5)
RBC # FLD: 12.2 % — SIGNIFICANT CHANGE UP (ref 10.3–14.5)
RBC # FLD: 12.3 % — SIGNIFICANT CHANGE UP (ref 10.3–14.5)
RBC # FLD: 12.9 % — SIGNIFICANT CHANGE UP (ref 10.3–14.5)
RBC BLD AUTO: ABNORMAL
RBC BLD AUTO: ABNORMAL
RBC CASTS # UR COMP ASSIST: SIGNIFICANT CHANGE UP /HPF (ref 0–4)
S PNEUM AG UR QL: NEGATIVE — SIGNIFICANT CHANGE UP
SARS-COV-2 IGG SERPL QL IA: NEGATIVE — SIGNIFICANT CHANGE UP
SARS-COV-2 IGM SERPL IA-ACNC: 0.21 INDEX — SIGNIFICANT CHANGE UP
SARS-COV-2 RNA SPEC QL NAA+PROBE: DETECTED
SCHISTOCYTES BLD QL AUTO: SLIGHT — SIGNIFICANT CHANGE UP
SMUDGE CELLS # BLD: PRESENT — SIGNIFICANT CHANGE UP
SODIUM SERPL-SCNC: 138 MMOL/L — SIGNIFICANT CHANGE UP (ref 135–145)
SODIUM SERPL-SCNC: 141 MMOL/L — SIGNIFICANT CHANGE UP (ref 135–145)
SODIUM SERPL-SCNC: 144 MMOL/L — SIGNIFICANT CHANGE UP (ref 135–145)
SODIUM SERPL-SCNC: 145 MMOL/L — SIGNIFICANT CHANGE UP (ref 135–145)
SODIUM SERPL-SCNC: 147 MMOL/L — HIGH (ref 135–145)
SODIUM SERPL-SCNC: 148 MMOL/L — HIGH (ref 135–145)
SODIUM SERPL-SCNC: 148 MMOL/L — HIGH (ref 135–145)
SODIUM SERPL-SCNC: 149 MMOL/L — HIGH (ref 135–145)
SP GR SPEC: 1.02 — SIGNIFICANT CHANGE UP (ref 1.01–1.02)
SPECIMEN SOURCE: SIGNIFICANT CHANGE UP
SPECIMEN SOURCE: SIGNIFICANT CHANGE UP
TROPONIN T SERPL-MCNC: <0.01 NG/ML — SIGNIFICANT CHANGE UP (ref 0–0.06)
TROPONIN T SERPL-MCNC: <0.01 NG/ML — SIGNIFICANT CHANGE UP (ref 0–0.06)
TSH SERPL-MCNC: 2.94 UIU/ML — SIGNIFICANT CHANGE UP (ref 0.27–4.2)
UROBILINOGEN FLD QL: 4 MG/DL
VARIANT LYMPHS # BLD: 5.2 % — SIGNIFICANT CHANGE UP (ref 0–6)
WBC # BLD: 1.84 K/UL — LOW (ref 3.8–10.5)
WBC # BLD: 3.61 K/UL — LOW (ref 3.8–10.5)
WBC # BLD: 4.34 K/UL — SIGNIFICANT CHANGE UP (ref 3.8–10.5)
WBC # BLD: 6.37 K/UL — SIGNIFICANT CHANGE UP (ref 3.8–10.5)
WBC # BLD: 8.23 K/UL — SIGNIFICANT CHANGE UP (ref 3.8–10.5)
WBC # FLD AUTO: 1.84 K/UL — LOW (ref 3.8–10.5)
WBC # FLD AUTO: 3.61 K/UL — LOW (ref 3.8–10.5)
WBC # FLD AUTO: 4.34 K/UL — SIGNIFICANT CHANGE UP (ref 3.8–10.5)
WBC # FLD AUTO: 6.37 K/UL — SIGNIFICANT CHANGE UP (ref 3.8–10.5)
WBC # FLD AUTO: 8.23 K/UL — SIGNIFICANT CHANGE UP (ref 3.8–10.5)
WBC UR QL: SIGNIFICANT CHANGE UP

## 2020-01-01 PROCEDURE — 99232 SBSQ HOSP IP/OBS MODERATE 35: CPT | Mod: CS

## 2020-01-01 PROCEDURE — 93010 ELECTROCARDIOGRAM REPORT: CPT

## 2020-01-01 PROCEDURE — 84484 ASSAY OF TROPONIN QUANT: CPT

## 2020-01-01 PROCEDURE — 96374 THER/PROPH/DIAG INJ IV PUSH: CPT

## 2020-01-01 PROCEDURE — 99285 EMERGENCY DEPT VISIT HI MDM: CPT

## 2020-01-01 PROCEDURE — 80053 COMPREHEN METABOLIC PANEL: CPT

## 2020-01-01 PROCEDURE — 99497 ADVNCD CARE PLAN 30 MIN: CPT | Mod: CS,25

## 2020-01-01 PROCEDURE — 99285 EMERGENCY DEPT VISIT HI MDM: CPT | Mod: 25

## 2020-01-01 PROCEDURE — 70450 CT HEAD/BRAIN W/O DYE: CPT | Mod: 26

## 2020-01-01 PROCEDURE — 71045 X-RAY EXAM CHEST 1 VIEW: CPT | Mod: 26

## 2020-01-01 PROCEDURE — 83735 ASSAY OF MAGNESIUM: CPT

## 2020-01-01 PROCEDURE — 85027 COMPLETE CBC AUTOMATED: CPT

## 2020-01-01 PROCEDURE — 99284 EMERGENCY DEPT VISIT MOD MDM: CPT | Mod: 25

## 2020-01-01 PROCEDURE — 70450 CT HEAD/BRAIN W/O DYE: CPT

## 2020-01-01 PROCEDURE — 86769 SARS-COV-2 COVID-19 ANTIBODY: CPT

## 2020-01-01 PROCEDURE — 71250 CT THORAX DX C-: CPT

## 2020-01-01 PROCEDURE — 99233 SBSQ HOSP IP/OBS HIGH 50: CPT | Mod: CS

## 2020-01-01 PROCEDURE — 99222 1ST HOSP IP/OBS MODERATE 55: CPT | Mod: CS

## 2020-01-01 PROCEDURE — 99223 1ST HOSP IP/OBS HIGH 75: CPT | Mod: CS,AI

## 2020-01-01 PROCEDURE — 99285 EMERGENCY DEPT VISIT HI MDM: CPT | Mod: CS

## 2020-01-01 PROCEDURE — 83615 LACTATE (LD) (LDH) ENZYME: CPT

## 2020-01-01 PROCEDURE — 96372 THER/PROPH/DIAG INJ SC/IM: CPT

## 2020-01-01 PROCEDURE — 84145 PROCALCITONIN (PCT): CPT

## 2020-01-01 PROCEDURE — 71045 X-RAY EXAM CHEST 1 VIEW: CPT

## 2020-01-01 PROCEDURE — 83690 ASSAY OF LIPASE: CPT

## 2020-01-01 PROCEDURE — 86140 C-REACTIVE PROTEIN: CPT

## 2020-01-01 PROCEDURE — 82962 GLUCOSE BLOOD TEST: CPT

## 2020-01-01 PROCEDURE — U0003: CPT

## 2020-01-01 PROCEDURE — 84443 ASSAY THYROID STIM HORMONE: CPT

## 2020-01-01 PROCEDURE — 99214 OFFICE O/P EST MOD 30 MIN: CPT

## 2020-01-01 PROCEDURE — 80048 BASIC METABOLIC PNL TOTAL CA: CPT

## 2020-01-01 PROCEDURE — 96376 TX/PRO/DX INJ SAME DRUG ADON: CPT

## 2020-01-01 PROCEDURE — 85025 COMPLETE CBC W/AUTO DIFF WBC: CPT

## 2020-01-01 PROCEDURE — 36415 COLL VENOUS BLD VENIPUNCTURE: CPT

## 2020-01-01 PROCEDURE — 81001 URINALYSIS AUTO W/SCOPE: CPT

## 2020-01-01 PROCEDURE — 87449 NOS EACH ORGANISM AG IA: CPT

## 2020-01-01 PROCEDURE — 93005 ELECTROCARDIOGRAM TRACING: CPT

## 2020-01-01 PROCEDURE — 0225U NFCT DS DNA&RNA 21 SARSCOV2: CPT

## 2020-01-01 PROCEDURE — 96375 TX/PRO/DX INJ NEW DRUG ADDON: CPT

## 2020-01-01 PROCEDURE — 92610 EVALUATE SWALLOWING FUNCTION: CPT

## 2020-01-01 PROCEDURE — 99238 HOSP IP/OBS DSCHRG MGMT 30/<: CPT | Mod: CS

## 2020-01-01 PROCEDURE — 87040 BLOOD CULTURE FOR BACTERIA: CPT

## 2020-01-01 PROCEDURE — 71250 CT THORAX DX C-: CPT | Mod: 26

## 2020-01-01 PROCEDURE — 82728 ASSAY OF FERRITIN: CPT

## 2020-01-01 PROCEDURE — 99223 1ST HOSP IP/OBS HIGH 75: CPT | Mod: CS

## 2020-01-01 RX ORDER — SACCHAROMYCES BOULARDII 250 MG
250 POWDER IN PACKET (EA) ORAL
Refills: 0 | Status: DISCONTINUED | OUTPATIENT
Start: 2020-01-01 | End: 2020-01-01

## 2020-01-01 RX ORDER — HALOPERIDOL DECANOATE 100 MG/ML
5 INJECTION INTRAMUSCULAR ONCE
Refills: 0 | Status: COMPLETED | OUTPATIENT
Start: 2020-01-01 | End: 2020-01-01

## 2020-01-01 RX ORDER — MEMANTINE HYDROCHLORIDE 10 MG/1
10 TABLET ORAL
Refills: 0 | Status: DISCONTINUED | OUTPATIENT
Start: 2020-01-01 | End: 2020-01-01

## 2020-01-01 RX ORDER — ENOXAPARIN SODIUM 100 MG/ML
40 INJECTION SUBCUTANEOUS DAILY
Refills: 0 | Status: DISCONTINUED | OUTPATIENT
Start: 2020-01-01 | End: 2020-01-01

## 2020-01-01 RX ORDER — CEFTRIAXONE 500 MG/1
1000 INJECTION, POWDER, FOR SOLUTION INTRAMUSCULAR; INTRAVENOUS EVERY 24 HOURS
Refills: 0 | Status: DISCONTINUED | OUTPATIENT
Start: 2020-01-01 | End: 2020-01-01

## 2020-01-01 RX ORDER — SODIUM CHLORIDE 9 MG/ML
1000 INJECTION, SOLUTION INTRAVENOUS
Refills: 0 | Status: DISCONTINUED | OUTPATIENT
Start: 2020-01-01 | End: 2020-01-01

## 2020-01-01 RX ORDER — AZITHROMYCIN 500 MG/1
500 TABLET, FILM COATED ORAL EVERY 24 HOURS
Refills: 0 | Status: DISCONTINUED | OUTPATIENT
Start: 2020-01-01 | End: 2020-01-01

## 2020-01-01 RX ORDER — DEXAMETHASONE 0.5 MG/5ML
6 ELIXIR ORAL DAILY
Refills: 0 | Status: COMPLETED | OUTPATIENT
Start: 2020-01-01 | End: 2020-01-01

## 2020-01-01 RX ORDER — ROBINUL 0.2 MG/ML
0.2 INJECTION INTRAMUSCULAR; INTRAVENOUS EVERY 6 HOURS
Refills: 0 | Status: DISCONTINUED | OUTPATIENT
Start: 2020-01-01 | End: 2020-01-01

## 2020-01-01 RX ORDER — SODIUM CHLORIDE 9 MG/ML
500 INJECTION INTRAMUSCULAR; INTRAVENOUS; SUBCUTANEOUS ONCE
Refills: 0 | Status: COMPLETED | OUTPATIENT
Start: 2020-01-01 | End: 2020-01-01

## 2020-01-01 RX ORDER — LEVETIRACETAM 250 MG/1
1 TABLET, FILM COATED ORAL
Qty: 0 | Refills: 0 | DISCHARGE

## 2020-01-01 RX ORDER — LEVETIRACETAM 100 MG/ML
500 INJECTION INTRAVENOUS TWICE DAILY
Qty: 900 | Refills: 1 | Status: COMPLETED | COMMUNITY
Start: 2020-01-01 | End: 2020-01-01

## 2020-01-01 RX ORDER — ASPIRIN/CALCIUM CARB/MAGNESIUM 324 MG
81 TABLET ORAL DAILY
Refills: 0 | Status: DISCONTINUED | OUTPATIENT
Start: 2020-01-01 | End: 2020-01-01

## 2020-01-01 RX ORDER — LEVETIRACETAM 250 MG/1
500 TABLET, FILM COATED ORAL ONCE
Refills: 0 | Status: COMPLETED | OUTPATIENT
Start: 2020-01-01 | End: 2020-01-01

## 2020-01-01 RX ORDER — QUETIAPINE FUMARATE 200 MG/1
1 TABLET, FILM COATED ORAL
Qty: 0 | Refills: 0 | DISCHARGE

## 2020-01-01 RX ORDER — MEROPENEM 1 G/30ML
1000 INJECTION INTRAVENOUS EVERY 12 HOURS
Refills: 0 | Status: COMPLETED | OUTPATIENT
Start: 2020-01-01 | End: 2020-01-01

## 2020-01-01 RX ORDER — AZITHROMYCIN 500 MG/1
500 TABLET, FILM COATED ORAL ONCE
Refills: 0 | Status: COMPLETED | OUTPATIENT
Start: 2020-01-01 | End: 2020-01-01

## 2020-01-01 RX ORDER — ACETAMINOPHEN 500 MG
650 TABLET ORAL ONCE
Refills: 0 | Status: COMPLETED | OUTPATIENT
Start: 2020-01-01 | End: 2020-01-01

## 2020-01-01 RX ORDER — ATORVASTATIN CALCIUM 80 MG/1
10 TABLET, FILM COATED ORAL AT BEDTIME
Refills: 0 | Status: DISCONTINUED | OUTPATIENT
Start: 2020-01-01 | End: 2020-01-01

## 2020-01-01 RX ORDER — AMLODIPINE BESYLATE 2.5 MG/1
7.5 TABLET ORAL DAILY
Refills: 0 | Status: DISCONTINUED | OUTPATIENT
Start: 2020-01-01 | End: 2020-01-01

## 2020-01-01 RX ORDER — ROBINUL 0.2 MG/ML
0 INJECTION INTRAMUSCULAR; INTRAVENOUS
Qty: 0 | Refills: 0 | DISCHARGE
Start: 2020-01-01

## 2020-01-01 RX ORDER — MEMANTINE HYDROCHLORIDE 10 MG/1
10 TABLET, FILM COATED ORAL TWICE DAILY
Qty: 180 | Refills: 1 | Status: ACTIVE | COMMUNITY
Start: 1900-01-01 | End: 1900-01-01

## 2020-01-01 RX ORDER — ASPIRIN/CALCIUM CARB/MAGNESIUM 324 MG
0 TABLET ORAL
Qty: 0 | Refills: 0 | DISCHARGE

## 2020-01-01 RX ORDER — DIPHENHYDRAMINE HCL 50 MG
50 CAPSULE ORAL ONCE
Refills: 0 | Status: DISCONTINUED | OUTPATIENT
Start: 2020-01-01 | End: 2020-01-01

## 2020-01-01 RX ORDER — AMLODIPINE BESYLATE 2.5 MG/1
7.5 TABLET ORAL ONCE
Refills: 0 | Status: COMPLETED | OUTPATIENT
Start: 2020-01-01 | End: 2020-01-01

## 2020-01-01 RX ORDER — QUETIAPINE FUMARATE 200 MG/1
25 TABLET, FILM COATED ORAL ONCE
Refills: 0 | Status: COMPLETED | OUTPATIENT
Start: 2020-01-01 | End: 2020-01-01

## 2020-01-01 RX ORDER — MEMANTINE HYDROCHLORIDE 10 MG/1
10 TABLET ORAL ONCE
Refills: 0 | Status: COMPLETED | OUTPATIENT
Start: 2020-01-01 | End: 2020-01-01

## 2020-01-01 RX ORDER — AMLODIPINE BESYLATE 2.5 MG/1
1.5 TABLET ORAL
Qty: 0 | Refills: 0 | DISCHARGE

## 2020-01-01 RX ORDER — LEVETIRACETAM 100 MG/ML
100 SOLUTION ORAL TWICE DAILY
Qty: 300 | Refills: 4 | Status: ACTIVE | COMMUNITY
Start: 2020-01-01 | End: 1900-01-01

## 2020-01-01 RX ORDER — CEFTRIAXONE 500 MG/1
1000 INJECTION, POWDER, FOR SOLUTION INTRAMUSCULAR; INTRAVENOUS ONCE
Refills: 0 | Status: COMPLETED | OUTPATIENT
Start: 2020-01-01 | End: 2020-01-01

## 2020-01-01 RX ORDER — DIPHENHYDRAMINE HCL 50 MG
50 CAPSULE ORAL ONCE
Refills: 0 | Status: COMPLETED | OUTPATIENT
Start: 2020-01-01 | End: 2020-01-01

## 2020-01-01 RX ORDER — MEMANTINE HYDROCHLORIDE 10 MG/1
1 TABLET ORAL
Qty: 0 | Refills: 0 | DISCHARGE

## 2020-01-01 RX ORDER — ALLOPURINOL 300 MG
100 TABLET ORAL ONCE
Refills: 0 | Status: COMPLETED | OUTPATIENT
Start: 2020-01-01 | End: 2020-01-01

## 2020-01-01 RX ORDER — LEVETIRACETAM 250 MG/1
1 TABLET, FILM COATED ORAL
Qty: 60 | Refills: 0
Start: 2020-01-01 | End: 2020-01-01

## 2020-01-01 RX ORDER — ALLOPURINOL 300 MG
1 TABLET ORAL
Qty: 0 | Refills: 0 | DISCHARGE

## 2020-01-01 RX ORDER — QUETIAPINE FUMARATE 50 MG/1
50 TABLET ORAL
Qty: 60 | Refills: 5 | Status: ACTIVE | COMMUNITY
Start: 1900-01-01 | End: 1900-01-01

## 2020-01-01 RX ORDER — QUETIAPINE FUMARATE 200 MG/1
25 TABLET, FILM COATED ORAL DAILY
Refills: 0 | Status: DISCONTINUED | OUTPATIENT
Start: 2020-01-01 | End: 2020-01-01

## 2020-01-01 RX ORDER — ALLOPURINOL 300 MG
100 TABLET ORAL DAILY
Refills: 0 | Status: DISCONTINUED | OUTPATIENT
Start: 2020-01-01 | End: 2020-01-01

## 2020-01-01 RX ADMIN — MEROPENEM 100 MILLIGRAM(S): 1 INJECTION INTRAVENOUS at 05:13

## 2020-01-01 RX ADMIN — Medication 1 MILLIGRAM(S): at 09:45

## 2020-01-01 RX ADMIN — SODIUM CHLORIDE 75 MILLILITER(S): 9 INJECTION, SOLUTION INTRAVENOUS at 22:29

## 2020-01-01 RX ADMIN — ENOXAPARIN SODIUM 40 MILLIGRAM(S): 100 INJECTION SUBCUTANEOUS at 12:38

## 2020-01-01 RX ADMIN — MEROPENEM 100 MILLIGRAM(S): 1 INJECTION INTRAVENOUS at 17:21

## 2020-01-01 RX ADMIN — Medication 6 MILLIGRAM(S): at 06:15

## 2020-01-01 RX ADMIN — SODIUM CHLORIDE 75 MILLILITER(S): 9 INJECTION, SOLUTION INTRAVENOUS at 08:11

## 2020-01-01 RX ADMIN — MEROPENEM 100 MILLIGRAM(S): 1 INJECTION INTRAVENOUS at 18:56

## 2020-01-01 RX ADMIN — Medication 6 MILLIGRAM(S): at 05:46

## 2020-01-01 RX ADMIN — MEROPENEM 100 MILLIGRAM(S): 1 INJECTION INTRAVENOUS at 05:38

## 2020-01-01 RX ADMIN — AMLODIPINE BESYLATE 7.5 MILLIGRAM(S): 2.5 TABLET ORAL at 05:07

## 2020-01-01 RX ADMIN — Medication 6 MILLIGRAM(S): at 04:48

## 2020-01-01 RX ADMIN — Medication 6 MILLIGRAM(S): at 12:35

## 2020-01-01 RX ADMIN — Medication 100 MILLIGRAM(S): at 11:22

## 2020-01-01 RX ADMIN — CEFTRIAXONE 1000 MILLIGRAM(S): 500 INJECTION, POWDER, FOR SOLUTION INTRAMUSCULAR; INTRAVENOUS at 09:02

## 2020-01-01 RX ADMIN — HALOPERIDOL DECANOATE 5 MILLIGRAM(S): 100 INJECTION INTRAMUSCULAR at 14:48

## 2020-01-01 RX ADMIN — LEVETIRACETAM 500 MILLIGRAM(S): 250 TABLET, FILM COATED ORAL at 17:51

## 2020-01-01 RX ADMIN — SODIUM CHLORIDE 500 MILLILITER(S): 9 INJECTION INTRAMUSCULAR; INTRAVENOUS; SUBCUTANEOUS at 09:32

## 2020-01-01 RX ADMIN — MEMANTINE HYDROCHLORIDE 10 MILLIGRAM(S): 10 TABLET ORAL at 17:41

## 2020-01-01 RX ADMIN — HALOPERIDOL DECANOATE 5 MILLIGRAM(S): 100 INJECTION INTRAMUSCULAR at 12:55

## 2020-01-01 RX ADMIN — MEROPENEM 100 MILLIGRAM(S): 1 INJECTION INTRAVENOUS at 17:07

## 2020-01-01 RX ADMIN — MEROPENEM 100 MILLIGRAM(S): 1 INJECTION INTRAVENOUS at 17:52

## 2020-01-01 RX ADMIN — SODIUM CHLORIDE 65 MILLILITER(S): 9 INJECTION, SOLUTION INTRAVENOUS at 09:08

## 2020-01-01 RX ADMIN — Medication 1 MILLIGRAM(S): at 09:48

## 2020-01-01 RX ADMIN — Medication 650 MILLIGRAM(S): at 07:20

## 2020-01-01 RX ADMIN — QUETIAPINE FUMARATE 25 MILLIGRAM(S): 200 TABLET, FILM COATED ORAL at 08:22

## 2020-01-01 RX ADMIN — Medication 650 MILLIGRAM(S): at 13:00

## 2020-01-01 RX ADMIN — ENOXAPARIN SODIUM 40 MILLIGRAM(S): 100 INJECTION SUBCUTANEOUS at 12:44

## 2020-01-01 RX ADMIN — Medication 1 MILLIGRAM(S): at 18:56

## 2020-01-01 RX ADMIN — CEFTRIAXONE 100 MILLIGRAM(S): 500 INJECTION, POWDER, FOR SOLUTION INTRAMUSCULAR; INTRAVENOUS at 08:32

## 2020-01-01 RX ADMIN — SODIUM CHLORIDE 65 MILLILITER(S): 9 INJECTION, SOLUTION INTRAVENOUS at 23:35

## 2020-01-01 RX ADMIN — ENOXAPARIN SODIUM 40 MILLIGRAM(S): 100 INJECTION SUBCUTANEOUS at 13:00

## 2020-01-01 RX ADMIN — Medication 1 MILLIGRAM(S): at 23:48

## 2020-01-01 RX ADMIN — ENOXAPARIN SODIUM 40 MILLIGRAM(S): 100 INJECTION SUBCUTANEOUS at 11:07

## 2020-01-01 RX ADMIN — SODIUM CHLORIDE 75 MILLILITER(S): 9 INJECTION, SOLUTION INTRAVENOUS at 15:22

## 2020-01-01 RX ADMIN — Medication 6 MILLIGRAM(S): at 07:00

## 2020-01-01 RX ADMIN — SODIUM CHLORIDE 75 MILLILITER(S): 9 INJECTION, SOLUTION INTRAVENOUS at 13:53

## 2020-01-01 RX ADMIN — Medication 1 MILLIGRAM(S): at 07:59

## 2020-01-01 RX ADMIN — Medication 650 MILLIGRAM(S): at 06:14

## 2020-01-01 RX ADMIN — SODIUM CHLORIDE 65 MILLILITER(S): 9 INJECTION, SOLUTION INTRAVENOUS at 15:40

## 2020-01-01 RX ADMIN — MEROPENEM 100 MILLIGRAM(S): 1 INJECTION INTRAVENOUS at 18:59

## 2020-01-01 RX ADMIN — AMLODIPINE BESYLATE 7.5 MILLIGRAM(S): 2.5 TABLET ORAL at 06:34

## 2020-01-01 RX ADMIN — QUETIAPINE FUMARATE 25 MILLIGRAM(S): 200 TABLET, FILM COATED ORAL at 20:59

## 2020-01-01 RX ADMIN — ENOXAPARIN SODIUM 40 MILLIGRAM(S): 100 INJECTION SUBCUTANEOUS at 08:22

## 2020-01-01 RX ADMIN — Medication 1 MILLIGRAM(S): at 12:27

## 2020-01-01 RX ADMIN — QUETIAPINE FUMARATE 25 MILLIGRAM(S): 200 TABLET, FILM COATED ORAL at 21:36

## 2020-01-01 RX ADMIN — Medication 50 MILLIGRAM(S): at 14:19

## 2020-01-01 RX ADMIN — ENOXAPARIN SODIUM 40 MILLIGRAM(S): 100 INJECTION SUBCUTANEOUS at 13:21

## 2020-01-01 RX ADMIN — ENOXAPARIN SODIUM 40 MILLIGRAM(S): 100 INJECTION SUBCUTANEOUS at 10:58

## 2020-01-01 RX ADMIN — AZITHROMYCIN 255 MILLIGRAM(S): 500 TABLET, FILM COATED ORAL at 08:55

## 2020-01-01 RX ADMIN — SODIUM CHLORIDE 1000 MILLILITER(S): 9 INJECTION INTRAMUSCULAR; INTRAVENOUS; SUBCUTANEOUS at 08:32

## 2020-01-01 RX ADMIN — MEROPENEM 100 MILLIGRAM(S): 1 INJECTION INTRAVENOUS at 05:37

## 2020-01-01 RX ADMIN — QUETIAPINE FUMARATE 25 MILLIGRAM(S): 200 TABLET, FILM COATED ORAL at 11:23

## 2020-01-01 RX ADMIN — SODIUM CHLORIDE 75 MILLILITER(S): 9 INJECTION, SOLUTION INTRAVENOUS at 10:58

## 2020-01-01 RX ADMIN — Medication 1 MILLIGRAM(S): at 00:56

## 2020-01-01 RX ADMIN — Medication 250 MILLIGRAM(S): at 17:42

## 2020-01-01 RX ADMIN — ENOXAPARIN SODIUM 40 MILLIGRAM(S): 100 INJECTION SUBCUTANEOUS at 12:25

## 2020-01-01 RX ADMIN — Medication 1 MILLIGRAM(S): at 14:38

## 2020-01-01 RX ADMIN — SODIUM CHLORIDE 75 MILLILITER(S): 9 INJECTION, SOLUTION INTRAVENOUS at 18:39

## 2020-01-01 RX ADMIN — Medication 1 MILLIGRAM(S): at 05:55

## 2020-01-01 RX ADMIN — MEROPENEM 100 MILLIGRAM(S): 1 INJECTION INTRAVENOUS at 07:00

## 2020-01-01 RX ADMIN — Medication 81 MILLIGRAM(S): at 11:22

## 2020-01-01 RX ADMIN — ENOXAPARIN SODIUM 40 MILLIGRAM(S): 100 INJECTION SUBCUTANEOUS at 09:13

## 2020-01-01 RX ADMIN — CEFTRIAXONE 100 MILLIGRAM(S): 500 INJECTION, POWDER, FOR SOLUTION INTRAMUSCULAR; INTRAVENOUS at 12:27

## 2020-01-01 RX ADMIN — AMLODIPINE BESYLATE 7.5 MILLIGRAM(S): 2.5 TABLET ORAL at 06:13

## 2020-01-01 RX ADMIN — ENOXAPARIN SODIUM 40 MILLIGRAM(S): 100 INJECTION SUBCUTANEOUS at 17:08

## 2020-01-01 RX ADMIN — Medication 2 MILLIGRAM(S): at 14:48

## 2020-01-01 RX ADMIN — Medication 6 MILLIGRAM(S): at 05:13

## 2020-01-01 RX ADMIN — AZITHROMYCIN 255 MILLIGRAM(S): 500 TABLET, FILM COATED ORAL at 12:27

## 2020-01-01 RX ADMIN — ENOXAPARIN SODIUM 40 MILLIGRAM(S): 100 INJECTION SUBCUTANEOUS at 11:21

## 2020-01-01 RX ADMIN — MEROPENEM 100 MILLIGRAM(S): 1 INJECTION INTRAVENOUS at 06:15

## 2020-01-01 RX ADMIN — ENOXAPARIN SODIUM 40 MILLIGRAM(S): 100 INJECTION SUBCUTANEOUS at 15:40

## 2020-01-01 RX ADMIN — QUETIAPINE FUMARATE 25 MILLIGRAM(S): 200 TABLET, FILM COATED ORAL at 11:06

## 2020-01-01 RX ADMIN — Medication 1 MILLIGRAM(S): at 23:35

## 2020-01-01 RX ADMIN — ENOXAPARIN SODIUM 40 MILLIGRAM(S): 100 INJECTION SUBCUTANEOUS at 12:26

## 2020-01-01 RX ADMIN — Medication 650 MILLIGRAM(S): at 14:13

## 2020-01-02 ENCOUNTER — MEDICATION RENEWAL (OUTPATIENT)
Age: 80
End: 2020-01-02

## 2020-02-20 NOTE — ED ADULT NURSE REASSESSMENT NOTE - NS ED NURSE REASSESS COMMENT FT1
pt status unchanged, refer to flowsheet and chart, pt safety maintained, pt hemodynamically stable, pending results and re-evaluation

## 2020-02-20 NOTE — ED PROVIDER NOTE - PHYSICAL EXAMINATION
Constitutional - well-developed; well nourished. Head - NCAT. Airway patent. Eyes - PERRL. CV - RRR. no murmur. no edema. Pulm - CTAB. Abd - soft, nt. no rebound. no guarding. Neuro - Alert. CHO Skin - No rash. MSK - normal ROM.

## 2020-02-20 NOTE — ED ADULT TRIAGE NOTE - CHIEF COMPLAINT QUOTE
pt arrive by ambulance after a sudden onset of confusion and agitation after passing out while seated in a chair during speech therapy. wife present giving hx pt arrive by ambulance after a sudden onset of confusion and agitation after passing out while seated in a chair during speech therapy. pt restless, unable to provide any information. wife present giving hx.  MD Sue called for denise

## 2020-02-20 NOTE — ED PROVIDER NOTE - PATIENT PORTAL LINK FT
You can access the FollowMyHealth Patient Portal offered by [facility] by registering at the following website: [email or web address]. By joining [facility]’s FollowMyHealth portal, you will also be able to view your health information using other applications (apps) compatible with our system.

## 2020-02-20 NOTE — ED ADULT NURSE NOTE - OBJECTIVE STATEMENT
79 year old male a&ox1 hx. of dementia and Alzheimer's comes to ED for evaluation. pt. received in yellow gown. as per wife pt. was in the chair and passed out. as per wife this has happened before.

## 2020-02-20 NOTE — ED PROVIDER NOTE - OBJECTIVE STATEMENT
Pt is an 78 yo M BIBEMS for AMS.  PMHx significant for dementia, hld.  Pt's wife states that patient was having a speech pathology eval today when he started staring off and then went unresponsive. no shaking movements.  Pt was out for a couple of minutes and when he woke up he was agitated and typically is not agitated.  Pt has had two similar episodes in the past and was seen at [facility] and wife was told he had a possible seizure. Pt followed up with his neurologist Dr. Starks and had a "head scan" but was not started on antiepileptics.  no other complaints.

## 2020-02-20 NOTE — ED PROVIDER NOTE - CLINICAL SUMMARY MEDICAL DECISION MAKING FREE TEXT BOX
labs and imaging reviewed.  pt with possible seizure. Pt back to baseline. I spoke with Dr. akins covering Dr. Starks who recommended starting 500 mg keppra bid and having patient f/up with Dr. Starks.  Family reassured and instructed to return for any new/concerning symptoms.  Pt given a copy of all results and instructed to f/up with pcp regarding any abnormal results.

## 2020-02-20 NOTE — ED ADULT NURSE REASSESSMENT NOTE - NS ED NURSE REASSESS COMMENT FT1
pt status unchanged, refer to flowsheet and chart, pt safety maintained, pt hemodynamically stable, pending CT prior to re-evaluation, seizure precautions maintained

## 2020-02-20 NOTE — ED ADULT NURSE NOTE - CHIEF COMPLAINT QUOTE
pt arrive by ambulance after a sudden onset of confusion and agitation after passing out while seated in a chair during speech therapy. pt restless, unable to provide any information. wife present giving hx.  MD Sue called for denise

## 2020-02-20 NOTE — ED ADULT NURSE REASSESSMENT NOTE - NS ED NURSE REASSESS COMMENT FT1
pt hemodynamically stable, refer to flowsheet and chart, pt to be discharged, pt family understood discharge instructions and plan of care, will follow up out-patient

## 2020-02-24 NOTE — HISTORY OF PRESENT ILLNESS
[FreeTextEntry1] : I saw this patient in the office today.\par \par As you recall he has a history of dementia.\par His difficulty has been more for short-term memory than long-term memory.\par He began having memory difficulty around 2013. I had first seen him for this in 2014. He had already been started on Exelon a week or so before his visit here.\par His memory difficulty gradually progressed. I had added Namenda.\par The cognitive difficulties had persisted ever since.\par \par His wife had reported that he is started to have difficulty with activities of daily living including dressing and hygiene.\par \par He is now off Exelon as his insurance for longer covered it.\par \par He had another episode on 2/20/2020.\par He was completely unresponsive and EMS was called.\par He was taken to the emergency room.\par He was started on Keppra 500 mg twice per day.\par \par \par \par

## 2020-02-24 NOTE — ASSESSMENT
[FreeTextEntry1] : This is a 79 year-old man with Alzheimer's type dementia. \par \par I will continue his current regimen of Namenda and Seroquel.\par \par He has been having recurrent episodes of unclear etiology.\par Certainly seizures in the differential diagnosis.\par I will continue Keppra 500 mg twice per day.\par He is having some difficulty swallowing pills.\par I will try the liquid.\par \par I will see him back in 4 months.

## 2020-02-24 NOTE — PHYSICAL EXAM
[General Appearance - In No Acute Distress] : in no acute distress [General Appearance - Alert] : alert [Oriented To Time, Place, And Person] : oriented to person, place, and time [Affect] : the affect was normal [Recall ___ / 3] : recall [unfilled] / 3 [Cranial Nerves Oculomotor (III)] : extraocular motion intact [Cranial Nerves Optic (II)] : visual acuity intact bilaterally,  visual fields full to confrontation, pupils equal round and reactive to light [Cranial Nerves Trigeminal (V)] : facial sensation intact symmetrically [Cranial Nerves Facial (VII)] : face symmetrical [Cranial Nerves Vestibulocochlear (VIII)] : hearing was intact bilaterally [Cranial Nerves Accessory (XI - Cranial And Spinal)] : head turning and shoulder shrug symmetric [Cranial Nerves Glossopharyngeal (IX)] : tongue and palate midline [Motor Tone] : muscle tone was normal in all four extremities [Cranial Nerves Hypoglossal (XII)] : there was no tongue deviation with protrusion [Motor Strength] : muscle strength was normal in all four extremities [Sensation Tactile Decrease] : light touch was intact [Sensation Pain / Temperature Decrease] : pain and temperature was intact [Sensation Vibration Decrease] : vibration was intact [Abnormal Walk] : normal gait [2+] : Patella left 2+ [Optic Disc Abnormality] : the optic disc were normal in size and color [Edema] : there was no peripheral edema [Involuntary Movements] : no involuntary movements were seen [Romberg's Sign] : Romberg's sign was negtive [Dysarthria] : no dysarthria [Plantar Reflex Right Only] : normal on the right [Coordination - Dysmetria Impaired Finger-to-Nose Bilateral] : not present [Plantar Reflex Left Only] : normal on the left

## 2020-02-24 NOTE — DATA REVIEWED
[de-identified] : EEG at [facility] in December of 2019 demonstrated only degenerative slowing. [de-identified] : CT scan of the head from 10/15/14 demonstrated only some mild atrophy. This was performed at [facility].\par \par B12 and thyroid function in the past have been normal. RPR was nonreactive.\par \par

## 2020-04-14 NOTE — ED ADULT NURSE NOTE - NS TRANSFER PATIENT BELONGINGS
..Left a detailed message and asking for patient to please call us back with which pharmacy t use.     Clothing

## 2020-07-17 NOTE — DATA REVIEWED
[de-identified] : EEG at [facility] in December of 2019 demonstrated only generalized slowing. [de-identified] : CT scan of the head from 10/15/14 demonstrated only some mild atrophy. This was performed at [facility].\par \par B12 and thyroid function in the past have been normal. RPR was nonreactive.\par \par

## 2020-07-17 NOTE — PHYSICAL EXAM
[General Appearance - Alert] : alert [Oriented To Time, Place, And Person] : oriented to person, place, and time [General Appearance - In No Acute Distress] : in no acute distress [Affect] : the affect was normal [Recall ___ / 3] : recall [unfilled] / 3 [Cranial Nerves Optic (II)] : visual acuity intact bilaterally,  visual fields full to confrontation, pupils equal round and reactive to light [Cranial Nerves Oculomotor (III)] : extraocular motion intact [Cranial Nerves Facial (VII)] : face symmetrical [Cranial Nerves Trigeminal (V)] : facial sensation intact symmetrically [Cranial Nerves Vestibulocochlear (VIII)] : hearing was intact bilaterally [Cranial Nerves Accessory (XI - Cranial And Spinal)] : head turning and shoulder shrug symmetric [Cranial Nerves Glossopharyngeal (IX)] : tongue and palate midline [Cranial Nerves Hypoglossal (XII)] : there was no tongue deviation with protrusion [Sensation Tactile Decrease] : light touch was intact [Motor Tone] : muscle tone was normal in all four extremities [Motor Strength] : muscle strength was normal in all four extremities [Sensation Vibration Decrease] : vibration was intact [Sensation Pain / Temperature Decrease] : pain and temperature was intact [Limited Balance] : the patient's balance was impaired [2+] : Patella right 2+ [Involuntary Movements] : no involuntary movements were seen [Optic Disc Abnormality] : the optic disc were normal in size and color [Edema] : there was no peripheral edema [Dysarthria] : no dysarthria [Coordination - Dysmetria Impaired Finger-to-Nose Bilateral] : not present [Romberg's Sign] : Romberg's sign was negtive [Plantar Reflex Left Only] : normal on the left [Plantar Reflex Right Only] : normal on the right [FreeTextEntry8] : Gait is somewhat broad-based and unsteady.

## 2020-07-17 NOTE — ASSESSMENT
[FreeTextEntry1] : This is a 79 year-old man with Alzheimer's type dementia. \par \par I will continue his current regimen of Namenda and Seroquel.\par \par He has been having recurrent episodes of unclear etiology.\par Certainly seizures in the differential diagnosis.\par I will continue Keppra (liquid) 500 mg twice per day.\par \par I will see him back in 6 months.

## 2020-07-17 NOTE — CONSULT LETTER
[Dear  ___] : Dear  [unfilled], [Courtesy Letter:] : I had the pleasure of seeing your patient, [unfilled], in my office today. [Sincerely,] : Sincerely, [Consult Closing:] : Thank you very much for allowing me to participate in the care of this patient.  If you have any questions, please do not hesitate to contact me. [Please see my note below.] : Please see my note below. [FreeTextEntry3] : Kunal Starks MD.

## 2020-07-17 NOTE — HISTORY OF PRESENT ILLNESS
[FreeTextEntry1] : I saw this patient in the office today.\par He presents with his wife as usual. \par \par As you recall he has a history of dementia.\par His difficulty has been more for short-term memory than long-term memory.\par He began having memory difficulty around 2013. I had first seen him for this in 2014. He had already been started on Exelon a week or so before his visit here.\par His memory difficulty gradually progressed. I had added Namenda.\par The cognitive difficulties had persisted ever since.\par \par His wife had reported that he is started to have difficulty with activities of daily living including dressing and hygiene.\par \par He is now off Exelon as his insurance for longer covered it.\par \par He had another episode on 2/20/2020.\par He was completely unresponsive and EMS was called.\par He was taken to the emergency room.\par He was started on Keppra 500 mg twice per day.\par \par \par \par

## 2020-10-14 PROBLEM — G40.909 RECURRENT SEIZURES: Status: ACTIVE | Noted: 2020-01-01

## 2020-10-14 NOTE — CONSULT LETTER
[Dear  ___] : Dear  [unfilled], [Courtesy Letter:] : I had the pleasure of seeing your patient, [unfilled], in my office today. [Sincerely,] : Sincerely, [Please see my note below.] : Please see my note below. [Consult Closing:] : Thank you very much for allowing me to participate in the care of this patient.  If you have any questions, please do not hesitate to contact me. [FreeTextEntry3] : Kunal Starks MD.

## 2020-10-14 NOTE — DATA REVIEWED
[de-identified] : CT scan of the head from 10/15/14 demonstrated only some mild atrophy. This was performed at [facility].\par \par B12 and thyroid function in the past have been normal. RPR was nonreactive.\par \par  [de-identified] : EEG at [facility] in December of 2019 demonstrated only generalized slowing.

## 2020-10-14 NOTE — HISTORY OF PRESENT ILLNESS
[FreeTextEntry1] : I saw this patient in the office today.\par He presents with his wife as usual. \par \par As you recall he has a history of dementia.\par His difficulty has been more for short-term memory than long-term memory.\par He began having memory difficulty around 2013. I had first seen him for this in 2014. He had already been started on Exelon a week or so before his visit here.\par His memory difficulty gradually progressed. I had added Namenda.\par The cognitive difficulties had persisted ever since.\par \par His wife had reported that he is started to have difficulty with activities of daily living including dressing and hygiene.\par \par He is now off Exelon as his insurance for longer covered it.\par He is on Namenda and Seroquel. \par \par He had another episode on 2/20/2020.\par He was completely unresponsive and EMS was called.\par He was taken to the emergency room.\par He was started on Keppra 500 mg twice per day.\par \par His wife reports that he has been having increased agitation lately.

## 2020-10-14 NOTE — ASSESSMENT
[FreeTextEntry1] : This is a 80 year-old man with Alzheimer's type dementia. \par \par I will continue his current regimen of Namenda and Seroquel.\par I will increase Seroquel to 50 mg twice per day.\par \par He has been having recurrent episodes of unclear etiology.\par Certainly seizures in the differential diagnosis.\par I will continue Keppra (liquid) 500 mg twice per day.\par \par I will see him back in 4 months.

## 2020-10-14 NOTE — PHYSICAL EXAM
[General Appearance - Alert] : alert [General Appearance - In No Acute Distress] : in no acute distress [Affect] : the affect was normal [Oriented To Time, Place, And Person] : oriented to person, place, and time [Recall ___ / 3] : recall [unfilled] / 3 [Cranial Nerves Oculomotor (III)] : extraocular motion intact [Cranial Nerves Optic (II)] : visual acuity intact bilaterally,  visual fields full to confrontation, pupils equal round and reactive to light [Cranial Nerves Trigeminal (V)] : facial sensation intact symmetrically [Cranial Nerves Facial (VII)] : face symmetrical [Cranial Nerves Vestibulocochlear (VIII)] : hearing was intact bilaterally [Cranial Nerves Glossopharyngeal (IX)] : tongue and palate midline [Cranial Nerves Hypoglossal (XII)] : there was no tongue deviation with protrusion [Motor Tone] : muscle tone was normal in all four extremities [Cranial Nerves Accessory (XI - Cranial And Spinal)] : head turning and shoulder shrug symmetric [Motor Strength] : muscle strength was normal in all four extremities [Sensation Vibration Decrease] : vibration was intact [Sensation Tactile Decrease] : light touch was intact [Sensation Pain / Temperature Decrease] : pain and temperature was intact [Limited Balance] : the patient's balance was impaired [2+] : Patella left 2+ [Optic Disc Abnormality] : the optic disc were normal in size and color [Edema] : there was no peripheral edema [Involuntary Movements] : no involuntary movements were seen [Romberg's Sign] : Romberg's sign was negtive [Coordination - Dysmetria Impaired Finger-to-Nose Bilateral] : not present [Dysarthria] : no dysarthria [Plantar Reflex Right Only] : normal on the right [Plantar Reflex Left Only] : normal on the left [FreeTextEntry8] : Gait is somewhat broad-based and unsteady. He requires support for ambulation.

## 2020-12-09 NOTE — ED PROVIDER NOTE - SECONDARY DIAGNOSIS.
Dementia without behavioral disturbance, unspecified dementia type Pneumonia of right upper lobe due to infectious organism Acute kidney injury

## 2020-12-09 NOTE — ED ADULT NURSE REASSESSMENT NOTE - NS ED NURSE REASSESS COMMENT FT1
received report from fiorella franklin and assumed care of pt. pt agitated rolling around in bed. eyes closed. confused. moves away from pain. cant follow basic commands. breathing even and unlabored. awaiting results for dispo. will continue to monitor,

## 2020-12-09 NOTE — ED PROVIDER NOTE - CLINICAL SUMMARY MEDICAL DECISION MAKING FREE TEXT BOX
Patient with increase tremors at home and gradual change in mental state with acute fall. Will evaluate for head trauma and cause to acute changes.

## 2020-12-09 NOTE — H&P ADULT - HISTORY OF PRESENT ILLNESS
79 y/o male with hx of dementia, HTN and HLD brought to ED due to a fall at home.  Per his wife for the past week the patient has been gradually getting worse. Due to his dementia he had his Seroquel increase to two tabs in the am and one at night around thanksgiving. They noted that it seemed to make him more confused and unsteady. They stopped the medication and his symptoms progressed. Patient is much more unsteady/shakier on his feet, unable to feed himself, and is having more trouble treating understanding/ following commands. She states this morning she noted that the patient was sitting at the bedside. She dozed off, next minute heard a crash and pt was found on the floor. In ED. pt evaluated, no trauma appreciated. Work up indicate a right sided PNA. Pt admitted for failure to thrive with PNA.

## 2020-12-09 NOTE — CONSULT NOTE ADULT - ASSESSMENT
This 81 y/o male with hx of dementia, HTN and HLD brought to ED due to a fall at home.  Per his wife for the past week the patient has been gradually getting worse. Due to his dementia he had his Seroquel increase to two tabs in the am and one at night around thanksgiving. They noted that it seemed to make him more confused and unsteady. They stopped the medication and his symptoms progressed. Patient is much more unsteady/shakier on his feet, unable to feed himself, and is having more trouble treating understanding/ following commands. She states this morning she noted that the patient was sitting at the bedside. She dozed off, next minute heard a crash and pt was found on the floor. In ED. pt evaluated, no trauma appreciated. Work up indicate a right sided PNA. Pt admitted for failure to thrive with PNA. (09 Dec 2020 09:34)    Patient cannot provide any history.     CXR reviewed.  SARS-2 testing is positive.    Impression:  pneumonia  COVID-19 infection  dementia  confusion  lung infiltrates    Plan:  - currently not with SOB or oxygen requirement  - continue empiric antibiotics    - should check CT chest if clinically worsen    defer Remdesivir for now    - START dexamethasone 6mg daily. x 10 days    - Continue supportive care measures  - continue Oxygenation as needed;  CONTINUE to titrate down as tolerated  - self- proning  as tolerated  - ENCOURGAGED OOB to chair  - encouraged incentive spirometry    trend inflammatory markers.     Will follow with you.

## 2020-12-09 NOTE — H&P ADULT - NSICDXPASTMEDICALHX_GEN_ALL_CORE_FT
PAST MEDICAL HISTORY:  Dementia without behavioral disturbance, unspecified dementia type     Essential hypertension

## 2020-12-09 NOTE — ED PROVIDER NOTE - PROGRESS NOTE DETAILS
Discussed the case with the patient's wife and daughter. They state that the past week the patient has been gradually getting worse. Due to his dementia he had his Seroquel increase to two tabs in the am and one at night around thanksgiving. They noted that it seemed to make him more confused and unsteady. They stopped the medication and his symptoms progressed. Patient is much more unsteady/shakier on his feet, unable to feed himself, and is having more trouble treating understanding/ following commands. They have ordered a hospital bed and walker for him. They have also attempted to obtain increase assistance at home, but only approved for help in the morning. She states this morning she noted that the patient was sitting at the bedside. She dozed off Discussed results and outcome of testing with the patient.  Patient advised to please follow up with their primary care doctor within the next 24 hours and return to the Emergency Department for worsening symptoms or any other concerns.  Patient advised that their doctor may call  to follow up on the specific results of the tests performed today in the emergency department. She dozed off for a couple of minutes in bed, then heard a scream and crash and found the patient next to the bed on the floor where fell against nightstand and wall. Patient was awake. signed out by Dr. Jo pending w/u for fall/syncope. worsening dementia/confusion/agitiation.  with rt upper lobe infiltrate. will tx as PNA. plan to admit. neuro to see. -Mike DO patient combative not cooperating with medical care, another 1mg ativan given. -Mike DO

## 2020-12-09 NOTE — H&P ADULT - ASSESSMENT
81 y/o male with hx of dementia, HTN and HLD brought to ED due to a fall at home.  Per his wife for the past week the patient has been gradually getting worse. Due to his dementia he had his Seroquel increase to two tabs in the am and one at night around thanksgiving. They noted that it seemed to make him more confused and unsteady. They stopped the medication and his symptoms progressed. Patient is much more unsteady/shakier on his feet, unable to feed himself, and is having more trouble treating understanding/ following commands. She states this morning she noted that the patient was sitting at the bedside. She dozed off, next minute heard a crash and pt was found on the floor. In ED. pt evaluated, no trauma appreciated. Work up indicate a right sided PNA. Pt admitted for failure to thrive with PNA.    1) Right sided PNA  - admit to medicine  - started on Rocephin & azithromycin will continue  - check urine legionella and strep  - blood and sputum cx  - pulse ox & O2    2) Failure to Thrive  - Nutrition consult  - SW consulted pt will likely require MARSHAL  - PT eval consulted    3) Leukopenia & thrombocytopenia  - can be due to PNA  - repeat level in am    4) Dementia  - on Namenda    5) HLD  - on statin    6) HTN  - Norvasc and monitor BP    7) Prophylactic measure  - DVT ppx: SCD for now since Plt are 100K today

## 2020-12-09 NOTE — ED ADULT NURSE NOTE - NSIMPLEMENTINTERV_GEN_ALL_ED
Implemented All Fall Risk Interventions:  [place] to call system. Call bell, personal items and telephone within reach. Instruct patient to call for assistance. Room bathroom lighting operational. Non-slip footwear when patient is off stretcher. Physically safe environment: no spills, clutter or unnecessary equipment. Stretcher in lowest position, wheels locked, appropriate side rails in place. Provide visual cue, wrist band, yellow gown, etc. Monitor gait and stability. Monitor for mental status changes and reorient to person, place, and time. Review medications for side effects contributing to fall risk. Reinforce activity limits and safety measures with patient and family.

## 2020-12-09 NOTE — H&P ADULT - NSHPLABSRESULTS_GEN_ALL_CORE
11.6   1.84  )-----------( 100      ( 09 Dec 2020 07:07 )             33.9       12-09    141  |  109<H>  |  29.0<H>  ----------------------------<  99  4.1   |  23.0  |  1.84<H>    Ca    8.3<L>      09 Dec 2020 07:07  Mg     1.9     12-09    TPro  6.6  /  Alb  3.6  /  TBili  0.6  /  DBili  x   /  AST  32  /  ALT  24  /  AlkPhos  68  12-09      < from: CT Head No Cont (12.09.20 @ 06:09) >     EXAM:  CT BRAIN                          PROCEDURE DATE:  12/09/2020          INTERPRETATION:  CT HEAD WITHOUT CONTRAST    INDICATION: 80 years old. Male. fall with head trauma.    COMPARISON: None available.    TECHNIQUE: Noncontrast axial CT head was obtained from the skull base to vertex.    FINDINGS:  No acute intracranial hemorrhage, mass effect or midline shift.  No CT evidence of acute large territory vascular infarct.  The ventricles and cortical sulci are prominent reflecting parenchymal volume loss.  Scattered hypodensities in the periventricular white matter are nonspecific, but likely sequela of small vessel ischemic disease.    Moderate mucosal thickening of the bilateral ethmoid air cells and maxillary sinuses. The mastoid air cells are well aerated. The native ocular lenses are surgically absent.  No displaced calvarial fracture.    IMPRESSION:  No acute intracranial hemorrhage or mass effect.    AMBAR SAENZ MD; Attending Radiologist  This document has been electronically signed. Dec  9 2020  6:17AM    < end of copied text >      < from: Xray Chest 1 View- PORTABLE-Urgent (12.09.20 @ 06:40) >       EXAM:  XR CHEST PORTABLE URGENT 1V                          PROCEDURE DATE:  12/09/2020          INTERPRETATION:  TECHNIQUE: Single portable view of the chest.    COMPARISON: None.    CLINICAL HISTORY: Chest Pain    FINDINGS:    Single frontal view of the chest demonstrates small right hilar infiltrate/atelectasis. Left hilar segmental atelectasis. The cardiomediastinal silhouette is normal. No acute osseous abnormalities.    IMPRESSION: Small right hilar infiltrate/atelectasis.      EZ RHODES MD; Attending Radiologist  This document has been electronically signed. Dec  9 2020  8:31AM    < end of copied text >

## 2020-12-09 NOTE — ED ADULT TRIAGE NOTE - CHIEF COMPLAINT QUOTE
patient BIBA s/p witnessed fall at home, per EMS patient is baseline nonverbal, hx of alzheimer's, wife said patient got out of bed to go to bathroom and fell and hit head on night stand. per EMS patient not on blood thinners. in triage patient alert to verbal stimuli, Dr. Jo called to bedside for eval and priority CT called.

## 2020-12-09 NOTE — ED ADULT NURSE NOTE - OBJECTIVE STATEMENT
pt noverbal  and confused BIBA s/p fall. responsive to verbal stimuli.  Pt wife witnessed fall, states pt hit head on night stand while getting up to go the bathroom. -Blood thinners, no obvious deformities noted.  pt received status post priority CT.   NAD noted, respirations even and unlabored.  Safety precautions in place.

## 2020-12-09 NOTE — ED PROVIDER NOTE - CARE PLAN
Principal Discharge DX:	Closed head injury, initial encounter  Secondary Diagnosis:	Dementia without behavioral disturbance, unspecified dementia type   Principal Discharge DX:	Closed head injury, initial encounter  Secondary Diagnosis:	Dementia without behavioral disturbance, unspecified dementia type  Secondary Diagnosis:	Pneumonia of right upper lobe due to infectious organism   Principal Discharge DX:	Closed head injury, initial encounter  Secondary Diagnosis:	Dementia without behavioral disturbance, unspecified dementia type  Secondary Diagnosis:	Pneumonia of right upper lobe due to infectious organism  Secondary Diagnosis:	Acute kidney injury

## 2020-12-09 NOTE — CONSULT NOTE ADULT - SUBJECTIVE AND OBJECTIVE BOX
[facility]  INFECTIOUS DISEASES AND INTERNAL MEDICINE at [place]  =======================================================  Tiffany Croft MD  Diplomates American Board of Internal Medicine and Infectious Diseases  Tel  [phone]  Fax [phone]  =======================================================    [facility]-[id number]  TIFFANY BOWLES   HPI: This 81 y/o male with hx of dementia, HTN and HLD brought to ED due to a fall at home.  Per his wife for the past week the patient has been gradually getting worse. Due to his dementia he had his [facility] two [facility] in the am [facility] They noted that it seemed to make him more confused and unsteady. They stopped the medication and his symptoms progressed. Patient is much more unsteady/shakier on his feet, unable to feed himself, and is having more trouble treating understanding/ following commands. She states this morning she noted that the patient was sitting at the bedside. She dozed off, next minute heard a crash and pt was found on the floor. In ED. pt evaluated, no trauma appreciated. Work up indicate a right sided PNA. Pt admitted for failure to thrive with PNA. (09 Dec 2020 09:34)    Patient cannot provide any history.     CXR reviewed.  SARS-2 testing is positive.      I have personally reviewed the labs and data; pertinent labs and data are listed in this note; please see below.   =======================================================  Past Medical & Surgical Hx:  =====================  PAST MEDICAL & SURGICAL HISTORY:  Essential hypertension    Dementia without behavioral disturbance, unspecified dementia type    H/O hernia repair      Problem List:  ==========  HEALTH ISSUES - PROBLEM Dx:        Social Hx:  =======  no toxic habits currently    FAMILY HISTORY:  No pertinent family history in first degree relatives    no significant family history of immunosuppressive disorders in mother or father   =======================================================    REVIEW OF SYSTEMS:  Limited due to medical condition    =======================================================  Allergies  No Known Allergies    Antibiotics:    Other medications:  allopurinol 100 milliGRAM(s) Oral daily  amLODIPine   Tablet 7.5 milliGRAM(s) Oral daily  aspirin  chewable 81 milliGRAM(s) Oral daily  atorvastatin 10 milliGRAM(s) Oral at bedtime  memantine 10 milliGRAM(s) Oral two times a day  saccharomyces boulardii 250 milliGRAM(s) Oral two times a day   azithromycin  IVPB   255 mL/Hr IV Intermittent (12-09-20 @ 08:55)    cefTRIAXone   IVPB   100 mL/Hr IV Intermittent (12-09-20 @ 08:32)      ======================================================  Physical Exam:  ============  T(F): 97.6 (09 Dec 2020 07:29), Max: 98.2 (09 Dec 2020 05:49)  HR: 79 (09 Dec 2020 07:29)  BP: 130/67 (09 Dec 2020 07:29)  RR: 16 (09 Dec 2020 07:29)  SpO2: 98% (09 Dec 2020 07:29) (98% - 99%)  temp max in last 48H T(F): , Max: 98.2 (12-09-20 @ 05:49)    General:  No acute distress.  Eye: FORCEABLE CLOSED eyes during exam  HENT: Normocephalic, Oral mucosa is moist, No pharyngeal erythema, No sinus tenderness.  Neck: Supple, No lymphadenopathy.  Respiratory: Lungs  with fair air entry posteriorly  Cardiovascular: Normal rate, Regular rhythm,   Gastrointestinal: Soft, Non-tender, Non-distended, Normal bowel sounds.  Genitourinary: No costovertebral angle tenderness.  Lymphatics: No lymphadenopathy neck,   Musculoskeletal: Normal range of motion, Normal strength.  Integumentary: No rash.  Neurologic: moves all extremities, Cranial Nerves II-XII are grossly intact.  Psychiatric: limited    =======================================================  Labs:                        11.6   1.84  )-----------( 100      ( 09 Dec 2020 07:07 )             33.9      12-09    141  |  109<H>  |  29.0<H>  ----------------------------<  99  4.1   |  23.0  |  1.84<H>    Ca    8.3<L>      09 Dec 2020 07:07  Mg     1.9     12-09    TPro  6.6  /  Alb  3.6  /  TBili  0.6  /  DBili  x   /  AST  32  /  ALT  24  /  AlkPhos  68  12-09    Creatinine, Serum: 1.84 mg/dL (12-09-20 @ 07:07)    SARS-CoV-2: Detected (12-09-20 @ 07:38)  Rapid RVP Result: Detected (12-09-20 @ 07:38)    < from: Xray Chest 1 View- PORTABLE-Urgent (12.09.20 @ 06:40) >     EXAM:  XR CHEST PORTABLE URGENT 1V                          PROCEDURE DATE:  12/09/2020          INTERPRETATION:  TECHNIQUE: Single portable view of the chest.    COMPARISON: None.    CLINICAL HISTORY: Chest Pain    FINDINGS:    Single frontal view of the chest demonstrates small right hilar infiltrate/atelectasis. Left hilar segmental atelectasis. The cardiomediastinal silhouette is normal. No acute osseous abnormalities.    IMPRESSION: Small right hilar infiltrate/atelectasis.            EZ RHODES MD; Attending Radiologist  This document has been electronically signed. Dec  9 2020  8:31AM    < end of copied text >

## 2020-12-09 NOTE — H&P ADULT - NSHPPHYSICALEXAM_GEN_ALL_CORE
PHYSICAL EXAM:  Vital Signs Last 24 Hrs  T(C): 36.4 (09 Dec 2020 07:29), Max: 36.8 (09 Dec 2020 05:49)  T(F): 97.6 (09 Dec 2020 07:29), Max: 98.2 (09 Dec 2020 05:49)  HR: 79 (09 Dec 2020 07:29) (79 - 85)  BP: 130/67 (09 Dec 2020 07:29) (119/59 - 130/67)  BP(mean): --  RR: 16 (09 Dec 2020 07:29) (16 - 20)  SpO2: 98% (09 Dec 2020 07:29) (98% - 99%)    GENERAL: NAD, well-groomed, well-developed  HEAD:  Atraumatic, Normocephalic  EYES: EOMI, PERRLA, conjunctiva and sclera clear  ENMT: No tonsillar erythema, exudates, or enlargement; Moist mucous membranes  NERVOUS SYSTEM:  spontaneous movement of all extremities, responsive to painful stimuli   CHEST/LUNG: decreased BS bilaterally; No rales, rhonchi, wheezing  HEART: Regular rate and rhythm; No murmurs  ABDOMEN: Soft, Nontender, Nondistended; Bowel sounds present  EXTREMITIES:  2+ Peripheral Pulses, No clubbing, cyanosis, or edema

## 2020-12-09 NOTE — ED PROVIDER NOTE - OBJECTIVE STATEMENT
81 yo male presents for evaluation s/p fall from sitting on/ standing next to bed. 79 yo male presents for evaluation s/p fall from sitting on/ standing next to bed. EMS states that the patient had a witnessed fall from bed landing on the floor.

## 2020-12-10 NOTE — PROGRESS NOTE ADULT - SUBJECTIVE AND OBJECTIVE BOX
[facility]  INFECTIOUS DISEASES AND INTERNAL MEDICINE at [place]  =======================================================  Tiffany Croft MD  Diplomates American Board of Internal Medicine and Infectious Diseases  Tel  [phone]  Fax [phone]  =======================================================    [id number]  TIFFANY BOWLES  follow up:   COVID-19 pneumonia    still with confusion  more awake today  no cooperative and restless per RN    =======================================================    REVIEW OF SYSTEMS:  Limited due to medical condition    =======================================================  Allergies  No Known Allergies         ======================================================  Physical Exam:  ============     General:  No acute distress.  Eye: BRYANNA EOMI  HENT: Normocephalic, Oral mucosa is moist, No pharyngeal erythema, No sinus tenderness.  Neck: Supple, No lymphadenopathy.  Respiratory: Lungs  with fair air entry posteriorly  Cardiovascular: Normal rate, Regular rhythm,   Gastrointestinal: Soft, Non-tender, Non-distended, Normal bowel sounds.  Genitourinary: No costovertebral angle tenderness.  Lymphatics: No lymphadenopathy neck,   Musculoskeletal: Normal range of motion, Normal strength.  Integumentary: No rash.  Neurologic: moves all extremities, Cranial Nerves II-XII are grossly intact.  Psychiatric: limited    =======================================================  Vitals:  ============  T(F): 98.5 (10 Dec 2020 08:13), Max: 100 (09 Dec 2020 17:00)  HR: 77 (10 Dec 2020 08:13)  BP: 153/58 (10 Dec 2020 08:13)  RR: 23 (10 Dec 2020 08:13)  SpO2: 99% (10 Dec 2020 08:13) (94% - 99%)  temp max in last 48H T(F): , Max: 100 (12-09-20 @ 17:00)    =======================================================  Current Antibiotics:  azithromycin  IVPB 500 milliGRAM(s) IV Intermittent every 24 hours  cefTRIAXone   IVPB 1000 milliGRAM(s) IV Intermittent every 24 hours    Other medications:  allopurinol 100 milliGRAM(s) Oral daily  amLODIPine   Tablet 7.5 milliGRAM(s) Oral daily  aspirin  chewable 81 milliGRAM(s) Oral daily  atorvastatin 10 milliGRAM(s) Oral at bedtime  dexAMETHasone  Injectable 6 milliGRAM(s) IV Push daily  memantine 10 milliGRAM(s) Oral two times a day  saccharomyces boulardii 250 milliGRAM(s) Oral two times a day      =======================================================  Labs:                        12.0   3.61  )-----------( 121      ( 10 Dec 2020 08:11 )             34.0      12-10    144  |  112<H>  |  35.0<H>  ----------------------------<  116<H>  4.3   |  17.0<L>  |  1.52<H>    Ca    8.5<L>      10 Dec 2020 08:11  Mg     2.1     12-10    TPro  6.6  /  Alb  3.6  /  TBili  0.6  /  DBili  x   /  AST  32  /  ALT  24  /  AlkPhos  68  12-09      Creatinine, Serum: 1.52 mg/dL (12-10-20 @ 08:11)  Creatinine, Serum: 1.84 mg/dL (12-09-20 @ 07:07)        Ferritin, Serum: 357 ng/mL (12-09-20 @ 19:25)      WBC Count: 3.61 K/uL (12-10-20 @ 08:11)  WBC Count: 1.84 K/uL (12-09-20 @ 07:07)    SARS-CoV-2: Detected (12-09-20 @ 07:38)  Rapid RVP Result: Detected (12-09-20 @ 07:38)      Lactate Dehydrogenase, Serum: 546 U/L (12-09-20 @ 19:25)    Alkaline Phosphatase, Serum: 68 U/L (12-09-20 @ 07:07)  Alanine Aminotransferase (ALT/SGPT): 24 U/L (12-09-20 @ 07:07)  Aspartate Aminotransferase (AST/SGOT): 32 U/L (12-09-20 @ 07:07)  Bilirubin Total, Serum: 0.6 mg/dL (12-09-20 @ 07:07)      < from: Xray Chest 1 View- PORTABLE-Urgent (12.09.20 @ 06:40) >     EXAM:  XR CHEST PORTABLE URGENT 1V                          PROCEDURE DATE:  12/09/2020          INTERPRETATION:  TECHNIQUE: Single portable view of the chest.    COMPARISON: None.    CLINICAL HISTORY: Chest Pain    FINDINGS:    Single frontal view of the chest demonstrates small right hilar infiltrate/atelectasis. Left hilar segmental atelectasis. The cardiomediastinal silhouette is normal. No acute osseous abnormalities.    IMPRESSION: Small right hilar infiltrate/atelectasis.            EZ RHODES MD; Attending Radiologist  This document has been electronically signed. Dec  9 2020  8:31AM    < end of copied text >

## 2020-12-10 NOTE — PROGRESS NOTE ADULT - SUBJECTIVE AND OBJECTIVE BOX
Patient is a 80y old  Male who presents with a chief complaint of failure to thrive with right sided pna (09 Dec 2020 11:23)    Patient seen and examined at bedside.     ALLERGIES:  No Known Allergies    MEDICATIONS  (STANDING):  allopurinol 100 milliGRAM(s) Oral daily  amLODIPine   Tablet 7.5 milliGRAM(s) Oral daily  aspirin  chewable 81 milliGRAM(s) Oral daily  atorvastatin 10 milliGRAM(s) Oral at bedtime  azithromycin  IVPB 500 milliGRAM(s) IV Intermittent every 24 hours  cefTRIAXone   IVPB 1000 milliGRAM(s) IV Intermittent every 24 hours  dexAMETHasone  Injectable 6 milliGRAM(s) IV Push daily  enoxaparin Injectable 40 milliGRAM(s) SubCutaneous daily  memantine 10 milliGRAM(s) Oral two times a day  QUEtiapine 25 milliGRAM(s) Oral daily  saccharomyces boulardii 250 milliGRAM(s) Oral two times a day    MEDICATIONS  (PRN):  LORazepam   Injectable 1 milliGRAM(s) IV Push every 6 hours PRN Agitation    Vital Signs Last 24 Hrs  T(F): 100 (10 Dec 2020 15:53), Max: 101.4 (10 Dec 2020 11:24)  HR: 103 (10 Dec 2020 15:53) (77 - 103)  BP: 148/58 (10 Dec 2020 15:53) (103/57 - 153/58)  RR: 22 (10 Dec 2020 15:53) (18 - 23)  SpO2: 92% (10 Dec 2020 15:53) (92% - 99%)  I&O's Summary    PHYSICAL EXAM:  General: +confused   ENT: MMM, no thrush  Neck: Supple, No JVD  Lungs: good air entry, non-labored breathing  Cardio: +s1/s2  Abdomen: Soft, Nontender, Nondistended; Bowel sounds present  Extremities: No calf tenderness    LABS:                        12.0   3.61  )-----------( 121      ( 10 Dec 2020 08:11 )             34.0     12-10    144  |  112  |  35.0  ----------------------------<  116  4.3   |  17.0  |  1.52    Ca    8.5      10 Dec 2020 08:11  Mg     2.1     12-10    TPro  6.6  /  Alb  3.6  /  TBili  0.6  /  DBili  x   /  AST  32  /  ALT  24  /  AlkPhos  68      Lipase, Serum: 29 U/L (20 @ 07:07)    eGFR if Non African American: 42 mL/min/1.73M2 (12-10-20 @ 08:11)  eGFR if : 49 mL/min/1.73M2 (12-10-20 @ 08:11)    CARDIAC MARKERS ( 09 Dec 2020 07:07 )  x     / <0.01 ng/mL / x     / x     / x        TSH 2.94   TSH with FT4 reflex --  Total T3 --    Urinalysis Basic - ( 09 Dec 2020 08:32 )  Color: Yellow / Appearance: Clear / S.020 / pH: x  Gluc: x / Ketone: Trace  / Bili: Small / Urobili: 4 mg/dL   Blood: x / Protein: 500 mg/dL / Nitrite: Negative   Leuk Esterase: Trace / RBC: 0-2 /HPF / WBC 3-5   Sq Epi: x / Non Sq Epi: Occasional / Bacteria: Few    RADIOLOGY & ADDITIONAL TESTS:  < from: Xray Chest 1 View- PORTABLE-Urgent (20 @ 06:40) >  IMPRESSION: Small right hilar infiltrate/atelectasis.  < end of copied text >    < from: CT Head No Cont (20 @ 06:09) >  IMPRESSION:  No acute intracranial hemorrhage or mass effect.  < end of copied text >    Care Discussed with Consultants/Other Providers:   ID

## 2020-12-10 NOTE — PROGRESS NOTE ADULT - ASSESSMENT
79 y/o male with hx of dementia, HTN and HLD brought to ED due to a fall at home.      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 all questions answered. advised pt and speech eval pending as not eating currently. advised patient confused. advised as visiting hours suspended can call rn station and ask for ipad for video conference at mutual time w/ rn assistant

## 2020-12-10 NOTE — PROGRESS NOTE ADULT - ASSESSMENT
This 81 y/o male with hx of dementia, HTN and HLD brought to ED due to a fall at home.  Per his wife for the past week the patient has been gradually getting worse. Due to his dementia he had his Seroquel increase to two tabs in the am and one at night around thanksgiving. They noted that it seemed to make him more confused and unsteady. They stopped the medication and his symptoms progressed. Patient is much more unsteady/shakier on his feet, unable to feed himself, and is having more trouble treating understanding/ following commands. She states this morning she noted that the patient was sitting at the bedside. She dozed off, next minute heard a crash and pt was found on the floor. In ED. pt evaluated, no trauma appreciated. Work up indicate a right sided PNA. Pt admitted for failure to thrive with PNA. (09 Dec 2020 09:34)    Patient cannot provide any history.     CXR reviewed.  SARS-2 testing is positive.    Impression:  pneumonia  COVID-19 infection  dementia  confusion  lung infiltrates    Plan:  - currently not with SOB or oxygen requirement    - continue empiric antibiotics  awaiting legionella results    - should check CT chest if clinically worsen    defer Remdesivir for now    - Continue dexamethasone 6mg daily. x 10 days    - Continue supportive care measures  - continue Oxygenation as needed;  CONTINUE to titrate down as tolerated  - self- proning  as tolerated  - ENCOURGAGED OOB to chair  - encouraged incentive spirometry    trend inflammatory markers.     Will follow with you.

## 2020-12-11 NOTE — PROGRESS NOTE ADULT - SUBJECTIVE AND OBJECTIVE BOX
[facility]  INFECTIOUS DISEASES AND INTERNAL MEDICINE at [place]  =======================================================  Tiffany Croft MD  Diplomates American Board of Internal Medicine and Infectious Diseases  Tel  [phone]  Fax [phone]  =======================================================    [id number]  TIFFANY BOWLES  follow up:   COVID-19 pneumonia    remains with confusion  does not follow command    still with fever    =======================================================    REVIEW OF SYSTEMS:  Limited due to medical condition    =======================================================  Allergies  No Known Allergies     ======================================================  Physical Exam:  ============     General:  No acute distress.  Eye: BRYANNA EOMI  HENT: Normocephalic, Oral mucosa is moist, No pharyngeal erythema, No sinus tenderness.  forcefully closing eyes  edentulous  Neck: Supple, No lymphadenopathy.  Respiratory: Lungs  with fair air entry posteriorly  Cardiovascular: Normal rate, Regular rhythm,   Gastrointestinal: Soft, Non-tender, Non-distended, Normal bowel sounds.  Genitourinary: No costovertebral angle tenderness.  Lymphatics: No lymphadenopathy neck,   Musculoskeletal: Normal range of motion, Normal strength.  Integumentary: No rash.  Neurologic: moves all extremities, Cranial Nerves II-XII are grossly intact.  Psychiatric: limited    =======================================================  Vitals:  ============  T(F): 100.5 (11 Dec 2020 08:25), Max: 103.5 (11 Dec 2020 06:03)  HR: 92 (11 Dec 2020 08:25)  BP: 135/73 (11 Dec 2020 08:25)  RR: 20 (11 Dec 2020 08:25)  SpO2: 98% (11 Dec 2020 08:25) (92% - 98%)  temp max in last 48H T(F): , Max: 103.5 (12-11-20 @ 06:03)    =======================================================  Current Antibiotics:  meropenem  IVPB 1000 milliGRAM(s) IV Intermittent every 12 hours    Other medications:  allopurinol 100 milliGRAM(s) Oral daily  amLODIPine   Tablet 7.5 milliGRAM(s) Oral daily  aspirin  chewable 81 milliGRAM(s) Oral daily  atorvastatin 10 milliGRAM(s) Oral at bedtime  dexAMETHasone  Injectable 6 milliGRAM(s) IV Push daily  enoxaparin Injectable 40 milliGRAM(s) SubCutaneous daily  memantine 10 milliGRAM(s) Oral two times a day  QUEtiapine 25 milliGRAM(s) Oral daily  saccharomyces boulardii 250 milliGRAM(s) Oral two times a day      =======================================================  Labs:                        12.0   3.61  )-----------( 121      ( 10 Dec 2020 08:11 )             34.0      12-10    144  |  112<H>  |  35.0<H>  ----------------------------<  116<H>  4.3   |  17.0<L>  |  1.52<H>    Ca    8.5<L>      10 Dec 2020 08:11  Mg     2.1     12-10      Culture - Blood (collected 12-09-20 @ 08:32)  Source: .Blood Blood    Culture - Blood (collected 12-09-20 @ 08:32)  Source: .Blood Blood    Creatinine, Serum: 1.52 mg/dL (12-10-20 @ 08:11)  Creatinine, Serum: 1.84 mg/dL (12-09-20 @ 07:07)    Ferritin, Serum: 357 ng/mL (12-09-20 @ 19:25)      WBC Count: 3.61 K/uL (12-10-20 @ 08:11)  WBC Count: 1.84 K/uL (12-09-20 @ 07:07)    SARS-CoV-2: Detected (12-09-20 @ 07:38)  Rapid RVP Result: Detected (12-09-20 @ 07:38)    Lactate Dehydrogenase, Serum: 546 U/L (12-09-20 @ 19:25)    Alkaline Phosphatase, Serum: 68 U/L (12-09-20 @ 07:07)  Alanine Aminotransferase (ALT/SGPT): 24 U/L (12-09-20 @ 07:07)  Aspartate Aminotransferase (AST/SGOT): 32 U/L (12-09-20 @ 07:07)  Bilirubin Total, Serum: 0.6 mg/dL (12-09-20 @ 07:07)        < from: Xray Chest 1 View- PORTABLE-Urgent (12.09.20 @ 06:40) >     EXAM:  XR CHEST PORTABLE URGENT 1V                          PROCEDURE DATE:  12/09/2020          INTERPRETATION:  TECHNIQUE: Single portable view of the chest.    COMPARISON: None.    CLINICAL HISTORY: Chest Pain    FINDINGS:    Single frontal view of the chest demonstrates small right hilar infiltrate/atelectasis. Left hilar segmental atelectasis. The cardiomediastinal silhouette is normal. No acute osseous abnormalities.    IMPRESSION: Small right hilar infiltrate/atelectasis.            EZ RHODES MD; Attending Radiologist  This document has been electronically signed. Dec  9 2020  8:31AM    < end of copied text >

## 2020-12-11 NOTE — DIETITIAN INITIAL EVALUATION ADULT. - PERTINENT MEDS FT
MEDICATIONS  (STANDING):  allopurinol 100 milliGRAM(s) Oral daily  amLODIPine   Tablet 7.5 milliGRAM(s) Oral daily  aspirin  chewable 81 milliGRAM(s) Oral daily  atorvastatin 10 milliGRAM(s) Oral at bedtime  dexAMETHasone  Injectable 6 milliGRAM(s) IV Push daily  enoxaparin Injectable 40 milliGRAM(s) SubCutaneous daily  memantine 10 milliGRAM(s) Oral two times a day  meropenem  IVPB 1000 milliGRAM(s) IV Intermittent every 12 hours  QUEtiapine 25 milliGRAM(s) Oral daily  saccharomyces boulardii 250 milliGRAM(s) Oral two times a day    MEDICATIONS  (PRN):  LORazepam   Injectable 1 milliGRAM(s) IV Push every 6 hours PRN Agitation

## 2020-12-11 NOTE — PROGRESS NOTE ADULT - ASSESSMENT
79 y/o male with hx of dementia, HTN and HLD brought to ED due to a fall at home.      #Failure to thrive  - in patient with covid 19 pna and possible superimposed aspiration pna (probable gram negative)  - w/ acute infectious encephalopathy and alzheimers dementia  - w/ agitation   - wrist restraints  - supportive care   - isolation precautions  - ID consult appreciated- abx per ID   - ativan prn, seroquel  - ct chest pending    #benjamin on ckd3  - unsure baseline  - monitor cr   - avoid nephrotoxic meds     #Leukopenia & thrombocytopenia  - probable 2/2 covid   - monitor     #Dementia  - Namenda    #HLD  - statin    #HTN- Essential  - norvasc  - monitor blood pressure     #DVT prophylaxis  - lovenox SC    attempted to call patient wife Claudia [phone] would like me to call daughter as she is driving and can not hear. patient hospital course to date reviewed with daughter Nicky [phone]. all questions answered. advised will start ivf dextrose for now; advised currently as being treated for aspiration pna and feeding via ng tube or peg tube (previously indicated in molst did not want now rethinking) does not decrease aspiration risk in patient with alzheimers w/ active infections including covid and aspiration pna. at this time she would not like to place peg and/or ng tube however will reeval in coming days. advised patient did fail speech eval yesterday. emotional support provided. all questions answered. advised palliative care consult placed as well

## 2020-12-11 NOTE — PROGRESS NOTE ADULT - SUBJECTIVE AND OBJECTIVE BOX
Patient is a 80y old  Male who presents with a chief complaint of failure to thrive with right sided pna (11 Dec 2020 11:22)      Patient seen and examined at bedside.     ALLERGIES:  No Known Allergies    MEDICATIONS  (STANDING):  allopurinol 100 milliGRAM(s) Oral daily  amLODIPine   Tablet 7.5 milliGRAM(s) Oral daily  aspirin  chewable 81 milliGRAM(s) Oral daily  atorvastatin 10 milliGRAM(s) Oral at bedtime  dexAMETHasone  Injectable 6 milliGRAM(s) IV Push daily  dextrose 5% + sodium chloride 0.9%. 1000 milliLiter(s) (75 mL/Hr) IV Continuous <Continuous>  enoxaparin Injectable 40 milliGRAM(s) SubCutaneous daily  memantine 10 milliGRAM(s) Oral two times a day  meropenem  IVPB 1000 milliGRAM(s) IV Intermittent every 12 hours  QUEtiapine 25 milliGRAM(s) Oral daily  saccharomyces boulardii 250 milliGRAM(s) Oral two times a day    MEDICATIONS  (PRN):  LORazepam   Injectable 1 milliGRAM(s) IV Push every 6 hours PRN Agitation    Vital Signs Last 24 Hrs  T(F): 98.8 (11 Dec 2020 11:16), Max: 103.5 (11 Dec 2020 06:03)  HR: 92 (11 Dec 2020 08:25) (78 - 103)  BP: 135/73 (11 Dec 2020 08:25) (130/70 - 148/60)  RR: 20 (11 Dec 2020 08:25) (20 - 22)  SpO2: 98% (11 Dec 2020 08:25) (92% - 98%)  I&O's Summary    PHYSICAL EXAM:  General: +confused   ENT: MMM, no thrush  Neck: Supple, No JVD  Lungs: good air entry, non-labored breathing  Cardio: +s1/s2  Abdomen: Soft, Nontender, Nondistended; Bowel sounds present  Extremities: No calf tenderness      LABS:                        12.0   3.61  )-----------( 121      ( 10 Dec 2020 08:11 )             34.0     12-10    144  |  112  |  35.0  ----------------------------<  116  4.3   |  17.0  |  1.52    Ca    8.5      10 Dec 2020 08:11  Mg     2.1     12-10    TPro  6.6  /  Alb  3.6  /  TBili  0.6  /  DBili  x   /  AST  32  /  ALT  24  /  AlkPhos  68        Lipase, Serum: 29 U/L (20 @ 07:07)    eGFR if Non African American: 42 mL/min/1.73M2 (12-10-20 @ 08:11)  eGFR if : 49 mL/min/1.73M2 (12-10-20 @ 08:11)    CARDIAC MARKERS ( 09 Dec 2020 07:07 )  x     / <0.01 ng/mL / x     / x     / x        TSH 2.94   TSH with FT4 reflex --  Total T3 --    Urinalysis Basic - ( 09 Dec 2020 08:32 )  Color: Yellow / Appearance: Clear / S.020 / pH: x  Gluc: x / Ketone: Trace  / Bili: Small / Urobili: 4 mg/dL   Blood: x / Protein: 500 mg/dL / Nitrite: Negative   Leuk Esterase: Trace / RBC: 0-2 /HPF / WBC 3-5   Sq Epi: x / Non Sq Epi: Occasional / Bacteria: Few    Cultures  Culture - Blood (collected 09 Dec 2020 08:32)  Source: .Blood Blood  Preliminary Report (11 Dec 2020 09:00):    No growth at 48 hours    Culture - Blood (collected 09 Dec 2020 08:32)  Source: .Blood Blood  Preliminary Report (11 Dec 2020 09:00):    No growth at 48 hours      RADIOLOGY & ADDITIONAL TESTS:  < from: Xray Chest 1 View- PORTABLE-Urgent (20 @ 06:40) >  IMPRESSION: Small right hilar infiltrate/atelectasis.  < end of copied text >    < from: CT Head No Cont (20 @ 06:09) >  IMPRESSION:  No acute intracranial hemorrhage or mass effect.  < end of copied text >    Care Discussed with Consultants/Other Providers:   ID

## 2020-12-11 NOTE — DIETITIAN INITIAL EVALUATION ADULT. - OTHER INFO
80 year old male with hx of dementia, HTN and HLD brought to ED due to a fall at home. Admitted with COVID, failure to thrive, leukopenia & thrombocytopenia, ROCKY on CKD. Pt on COVID isolation precautions. Noted agitated, combative, and uncooperative. NPO at this time. Skin intact. No edema documented. Pt at high risk from protein-calorie malnutrition. RD to follow up.

## 2020-12-11 NOTE — PROGRESS NOTE ADULT - ASSESSMENT
This 81 y/o male with hx of dementia, HTN and HLD brought to ED due to a fall at home.  Per his wife for the past week the patient has been gradually getting worse. Due to his dementia he had his Seroquel increase to two tabs in the am and one at night around thanksgiving. They noted that it seemed to make him more confused and unsteady. They stopped the medication and his symptoms progressed. Patient is much more unsteady/shakier on his feet, unable to feed himself, and is having more trouble treating understanding/ following commands. She states this morning she noted that the patient was sitting at the bedside. She dozed off, next minute heard a crash and pt was found on the floor. In ED. pt evaluated, no trauma appreciated. Work up indicate a right sided PNA. Pt admitted for failure to thrive with PNA. (09 Dec 2020 09:34)    Patient cannot provide any history.     CXR reviewed.  SARS-2 testing is positive.    Impression:  viral pneumonia  COVID-19 infection  dementia  confusion  lung infiltrates    Plan:  - currently not with SOB or oxygen requirement  - STILL WITH fevers  - changed CEFTRIAXONE --> MERREM  LEGIONELLA NEGATIVE; WILL STOP AZITHRO    - CHECK CT CHEST TODAY - R/O WORSENING OF PNA    defer Remdesivir for now; STILL STABLE ON ROOM AIR    - Continue dexamethasone 6mg daily. x 10 days    - Continue supportive care measures  - continue Oxygenation as needed;  CONTINUE to titrate down as tolerated  - self- proning  as tolerated   - encouraged incentive spirometry    trend inflammatory markers.     Will follow with you.

## 2020-12-12 NOTE — SWALLOW BEDSIDE ASSESSMENT ADULT - SWALLOW EVAL: DIAGNOSIS
Severe oral dysphagia confounded by lethargy and reduced cognition. Unable to assess pharyngeal stage of swallow 2* no swallow triggered and bolus removed from pt's mouth

## 2020-12-12 NOTE — SWALLOW BEDSIDE ASSESSMENT ADULT - ORAL PREPARATORY PHASE
achieved labial seal on spoon, however no attempt to strip bolus. Bolus placed intraorally, however pt with absent attention to bolus and no attempt to form bolus or move A-P. Bolus removed from pt's mouth by clinician

## 2020-12-12 NOTE — PROGRESS NOTE ADULT - ASSESSMENT
80y/oM PMH dementia, HTN, HLD brought to ER s/p fall at home     Failure to thrive   COVID-19 PNA   Acute infectious encephalopathy with Alzheimer's dementia   -cont wrist restraints for safety   -supportive care   -ID recs appreciated   -Ceftriaxone changed to Merrem   -legionella negative, azithromycin stopped   -CT chest w/multifocal pna  -cont dexamethasone   -wean supplemental O2 as tolerated   -ativan PRN, seroquel     Leukopenia, thrombocytopenia   -likely 2/2 COVID infection     ROCKY on CKD 3  -avoid nephrotoxins   -monitor sCr   -on IVF     Hypernatremia   -IVF adjusted      80y/oM PMH dementia, HTN, HLD brought to ER s/p fall at home     Failure to thrive   COVID-19 PNA   Acute infectious encephalopathy with Alzheimer's dementia   Dysphagia   -cont wrist restraints for safety   -supportive care   -ID recs appreciated   -Ceftriaxone changed to Merrem   -legionella negative, azithromycin stopped   -CT chest w/multifocal pna  -cont dexamethasone   -wean supplemental O2 as tolerated   -ativan PRN, seroquel   -failed swallow eval again today 12/12, pt complete NPO    Leukopenia, thrombocytopenia   -likely 2/2 COVID infection     ROCKY on CKD 3  -avoid nephrotoxins   -monitor sCr   -on IVF     Hypernatremia   -IVF adjusted     HTN   -cont norvasc     DVT ppx: cont lovenox sq     daughter, Nicky, [phone], updated 80y/oM PMH dementia, HTN, HLD brought to ER s/p fall at home     Failure to thrive   COVID-19 PNA   Acute infectious encephalopathy with Alzheimer's dementia   Dysphagia   -cont wrist restraints for safety   -supportive care   -ID recs appreciated   -Ceftriaxone changed to Merrem   -legionella negative, azithromycin stopped   -CT chest w/multifocal pna  -cont dexamethasone   -wean supplemental O2 as tolerated   -ativan PRN, seroquel   -failed swallow eval again today 12/12, pt complete NPO    Leukopenia, thrombocytopenia   -likely 2/2 COVID infection     ROCKY on CKD 3  -avoid nephrotoxins   -monitor sCr   -on IVF     Hypernatremia   -IVF adjusted     HTN   -cont norvasc     DVT ppx: cont lovenox sq     Dispo: pending clinical improvement, pt remains NPO     daughter, Nicky, [phone], updated

## 2020-12-12 NOTE — SWALLOW BEDSIDE ASSESSMENT ADULT - SLP PERTINENT HISTORY OF CURRENT PROBLEM
79 y/o male with hx of dementia, HTN and HLD brought to ED due to a fall at home.  Patient with covid 19 pna and possible superimposed aspiration pna (probable gram negative) w/ acute infectious encephalopathy and alzheimers dementia,  w/ agitation, wrist restraints

## 2020-12-12 NOTE — SWALLOW BEDSIDE ASSESSMENT ADULT - SLP GENERAL OBSERVATIONS
Pt received asleep in bed, arousable to voice, however lethargic and requiring cues to sustain arousal, 0x0, b/l wrist restraints, occasional attempts to swat at clinician. +02 via nc

## 2020-12-12 NOTE — PROGRESS NOTE ADULT - SUBJECTIVE AND OBJECTIVE BOX
TIFFANY BOWLES    [id number]    80y      Male    CC: PNA    INTERVAL HPI/OVERNIGHT EVENTS: pt seen and examined. no acute events on/. pt remains NPO.     REVIEW OF SYSTEMS:  unable to obtain 2/2 mental status     Vital Signs Last 24 Hrs  T(C): 37.2 (12 Dec 2020 08:46), Max: 37.2 (11 Dec 2020 21:47)  T(F): 98.9 (12 Dec 2020 08:46), Max: 99 (11 Dec 2020 21:47)  HR: 76 (12 Dec 2020 08:46) (65 - 87)  BP: 135/71 (12 Dec 2020 08:46) (106/75 - 135/71)  BP(mean): --  RR: 20 (12 Dec 2020 08:46) (18 - 20)  SpO2: 98% (12 Dec 2020 08:46) (98% - 100%)    PHYSICAL EXAM:    GENERAL: NAD  HEENT:  +EOMI  NECK: soft, supple  CHEST/LUNG: decreased sounds b/l bases, respirations non-labored on 2L NC  HEART: S1S2+, Regular rate and rhythm  ABDOMEN: Soft, Nontender, Nondistended; Bowel sounds present  SKIN: warm, dry  NEURO: confused, minimally verbal    LABS:                        12.9   8.23  )-----------( 157      ( 12 Dec 2020 06:18 )             36.4     12-12    148<H>  |  119<H>  |  55.0<H>  ----------------------------<  151<H>  4.2   |  21.0<L>  |  1.74<H>    Ca    8.8      12 Dec 2020 06:18              MEDICATIONS  (STANDING):  allopurinol 100 milliGRAM(s) Oral daily  amLODIPine   Tablet 7.5 milliGRAM(s) Oral daily  aspirin  chewable 81 milliGRAM(s) Oral daily  atorvastatin 10 milliGRAM(s) Oral at bedtime  dexAMETHasone  Injectable 6 milliGRAM(s) IV Push daily  dextrose 5%. 1000 milliLiter(s) (75 mL/Hr) IV Continuous <Continuous>  enoxaparin Injectable 40 milliGRAM(s) SubCutaneous daily  memantine 10 milliGRAM(s) Oral two times a day  meropenem  IVPB 1000 milliGRAM(s) IV Intermittent every 12 hours  QUEtiapine 25 milliGRAM(s) Oral daily  saccharomyces boulardii 250 milliGRAM(s) Oral two times a day    MEDICATIONS  (PRN):  LORazepam   Injectable 1 milliGRAM(s) IV Push every 6 hours PRN Agitation      RADIOLOGY & ADDITIONAL TESTS:  < from: CT Chest No Cont (12.11.20 @ 14:35) >  IMPRESSION:  Scattered groundglass alveolar opacities in the bilateral upper lobes with bibasilar atelectasis likely representing multifocal pneumonia with consideration for multifocal atypical viral pneumonia    < end of copied text >

## 2020-12-13 NOTE — PROGRESS NOTE ADULT - SUBJECTIVE AND OBJECTIVE BOX
TIFFANY BOWLES    [id number]    80y      Male    CC: PNA    INTERVAL HPI/OVERNIGHT EVENTS: Pt seen and examined. no acute events o/n. more alert this am. seen on 2L NC    REVIEW OF SYSTEMS:  unable to obtain 2/2 mental status     Vital Signs Last 24 Hrs  T(C): 36.8 (13 Dec 2020 08:39), Max: 37 (12 Dec 2020 16:39)  T(F): 98.2 (13 Dec 2020 08:39), Max: 98.6 (12 Dec 2020 16:39)  HR: 94 (13 Dec 2020 08:39) (75 - 94)  BP: 160/81 (13 Dec 2020 08:39) (130/72 - 160/81)  BP(mean): --  RR: 18 (13 Dec 2020 08:39) (18 - 20)  SpO2: 95% (13 Dec 2020 08:39) (95% - 98%)    PHYSICAL EXAM:    GENERAL: NAD  HEENT:  +EOMI  NECK: soft, supple  CHEST/LUNG: decreased sounds b/l bases, respirations non-labored on 2L NC  HEART: S1S2+, Regular rate and rhythm  ABDOMEN: Soft, Nontender, Nondistended; Bowel sounds present  SKIN: warm, dry  NEURO: confused, minimally verbal    LABS:                        12.9   8.23  )-----------( 157      ( 12 Dec 2020 06:18 )             36.4     12-12    148<H>  |  119<H>  |  55.0<H>  ----------------------------<  151<H>  4.2   |  21.0<L>  |  1.74<H>    Ca    8.8      12 Dec 2020 06:18              MEDICATIONS  (STANDING):  allopurinol 100 milliGRAM(s) Oral daily  amLODIPine   Tablet 7.5 milliGRAM(s) Oral daily  aspirin  chewable 81 milliGRAM(s) Oral daily  atorvastatin 10 milliGRAM(s) Oral at bedtime  dexAMETHasone  Injectable 6 milliGRAM(s) IV Push daily  dextrose 5%. 1000 milliLiter(s) (75 mL/Hr) IV Continuous <Continuous>  enoxaparin Injectable 40 milliGRAM(s) SubCutaneous daily  memantine 10 milliGRAM(s) Oral two times a day  meropenem  IVPB 1000 milliGRAM(s) IV Intermittent every 12 hours  QUEtiapine 25 milliGRAM(s) Oral daily  saccharomyces boulardii 250 milliGRAM(s) Oral two times a day    MEDICATIONS  (PRN):  LORazepam   Injectable 1 milliGRAM(s) IV Push every 6 hours PRN Agitation      RADIOLOGY & ADDITIONAL TESTS:

## 2020-12-13 NOTE — PROGRESS NOTE ADULT - ASSESSMENT
80y/oM PMH dementia, HTN, HLD brought to ER s/p fall at home     Failure to thrive   COVID-19 PNA   Probably superimposed aspiration PNA (2/2 gram negative organism)  Acute infectious encephalopathy with Alzheimer's dementia   Dysphagia   Dementia with behavioral disturbance   -CT chest w/multifocal pna  -supportive care   -ID recs appreciated   -Ceftriaxone changed to Merrem   -legionella negative, azithromycin stopped   -cont wrist restraints for safety   -prn ativan  -cont dexamethasone   -wean supplemental O2 as tolerated, on 2L NC  -remdesivir deferred for now  -failed swallow eval again 12/12, pt remains NPO    Leukopenia, thrombocytopenia   -likely 2/2 COVID infection     ROCKY on CKD 3  -avoid nephrotoxins   -monitor sCr   -on IVF     Hypernatremia   -IVF adjusted     HTN   -cont norvasc     DVT ppx: cont lovenox sq     Prognosis guarded   Dispo: pending clinical improvement, pt remains NPO

## 2020-12-14 NOTE — PROGRESS NOTE ADULT - ASSESSMENT
This 79 y/o male with hx of dementia, HTN and HLD brought to ED due to a fall at home.  Per his wife for the past week the patient has been gradually getting worse. Due to his dementia he had his Seroquel increase to two tabs in the am and one at night around thanksgiving. They noted that it seemed to make him more confused and unsteady. They stopped the medication and his symptoms progressed. Patient is much more unsteady/shakier on his feet, unable to feed himself, and is having more trouble treating understanding/ following commands. She states this morning she noted that the patient was sitting at the bedside. She dozed off, next minute heard a crash and pt was found on the floor. In ED. pt evaluated, no trauma appreciated. Work up indicate a right sided PNA. Pt admitted for failure to thrive with PNA. (09 Dec 2020 09:34)    Patient cannot provide any history.     CXR reviewed.  SARS-2 testing is positive.    Impression:  viral pneumonia  COVID-19 infection  dementia  confusion  lung infiltrates    Plan:  - currently not with SOB or oxygen requirement  - low grade temps noted.   not currently on oxygen    - currently on MERREM; started on 12/11 -   will plan for a 5 day course  thru 12/16, after AM dose    LEGIONELLA NEGATIVE; off AZITHRO    - CHECK CT CHEST TODAY - R/O WORSENING OF PNA    defer Remdesivir for now; STILL STABLE ON ROOM AIR    - Continue dexamethasone 6mg daily. x 10 days    - Continue supportive care measures  - continue Oxygenation as needed;     - self- proning  as tolerated   - encouraged incentive spirometry    trend inflammatory markers.     Will follow with you.

## 2020-12-14 NOTE — PROGRESS NOTE ADULT - SUBJECTIVE AND OBJECTIVE BOX
TIFFANY BOWLES    [id number]    80y      Male    CC: weakness    INTERVAL HPI/OVERNIGHT EVENTS: pt seen and examined. no acute events o/n.     REVIEW OF SYSTEMS:  unable to obtain 2/2 mental status     Vital Signs Last 24 Hrs  T(C): 36.8 (14 Dec 2020 16:24), Max: 36.8 (14 Dec 2020 16:24)  T(F): 98.2 (14 Dec 2020 16:24), Max: 98.2 (14 Dec 2020 16:24)  HR: 114 (14 Dec 2020 16:24) (83 - 114)  BP: 147/80 (14 Dec 2020 16:24) (132/66 - 156/81)  BP(mean): --  RR: 20 (14 Dec 2020 16:24) (18 - 20)  SpO2: 95% (14 Dec 2020 16:24) (93% - 98%)    PHYSICAL EXAM:    GENERAL: NAD  HEENT: +EOMI  CHEST/LUNG: respirations non-labored on 2L NC  HEART: S1S2+, Regular rate and rhythm  ABDOMEN: Soft, Nontender, Nondistended; Bowel sounds present  SKIN: warm, dry  NEURO: awake, minimally verbal, not following commands    LABS:                        12.5   6.37  )-----------( 173      ( 13 Dec 2020 09:50 )             36.0     12-14    149<H>  |  118<H>  |  45.0<H>  ----------------------------<  148<H>  3.9   |  20.0<L>  |  1.28    Ca    8.7      14 Dec 2020 07:26  Mg     2.4     12-14    TPro  6.7  /  Alb  3.2<L>  /  TBili  0.8  /  DBili  x   /  AST  158<H>  /  ALT  70<H>  /  AlkPhos  66  12-13            MEDICATIONS  (STANDING):  allopurinol 100 milliGRAM(s) Oral daily  amLODIPine   Tablet 7.5 milliGRAM(s) Oral daily  aspirin  chewable 81 milliGRAM(s) Oral daily  atorvastatin 10 milliGRAM(s) Oral at bedtime  dextrose 5%. 1000 milliLiter(s) (75 mL/Hr) IV Continuous <Continuous>  enoxaparin Injectable 40 milliGRAM(s) SubCutaneous daily  memantine 10 milliGRAM(s) Oral two times a day  meropenem  IVPB 1000 milliGRAM(s) IV Intermittent every 12 hours  QUEtiapine 25 milliGRAM(s) Oral daily  saccharomyces boulardii 250 milliGRAM(s) Oral two times a day    MEDICATIONS  (PRN):  LORazepam   Injectable 1 milliGRAM(s) IV Push every 6 hours PRN Agitation      RADIOLOGY & ADDITIONAL TESTS:

## 2020-12-14 NOTE — PROGRESS NOTE ADULT - ASSESSMENT
80y/oM PMH dementia, HTN, HLD brought to ER s/p fall at home     Failure to thrive   COVID-19 PNA   Probably superimposed aspiration PNA (2/2 gram negative organism)  Acute infectious encephalopathy with Alzheimer's dementia   Dysphagia   Dementia with behavioral disturbance   -CT chest w/multifocal pna  -supportive care   -ID recs appreciated   -Ceftriaxone changed to Merrem   -legionella negative, azithromycin stopped   -cont wrist restraints for safety   -prn ativan  -cont dexamethasone   -wean supplemental O2 as tolerated, on 2L NC  -remdesivir deferred for now  -failed swallow eval again 12/12, pt remains NPO    Leukopenia, thrombocytopenia   -likely 2/2 COVID infection     ROCKY on CKD 3  -avoid nephrotoxins   -monitor sCr   -on IVF     Hypernatremia   -IVF adjusted     HTN   -cont norvasc     DVT ppx: cont lovenox sq     Prognosis guarded   Dispo: pending clinical improvement, pt remains NPO     pt's daughter, eun [phone] updated. eun to d/w pt's wife and other daughter regarding possible comfort measures. agreeable to also discuss further with palliative and hospice teams.

## 2020-12-15 NOTE — CONSULT NOTE ADULT - ASSESSMENT
80yr man, hx of advanced dementia - non ambulatory, minimally verbal, HTN admitted with COVID PNA with further functional decline

## 2020-12-15 NOTE — PROGRESS NOTE ADULT - SUBJECTIVE AND OBJECTIVE BOX
[facility]  INFECTIOUS DISEASES AND INTERNAL MEDICINE at [facility]  =======================================================  Tiffany Croft MD  Diplomates American Board of Internal Medicine and Infectious Diseases  Tel  [phone]  Fax [phone]  =======================================================    [id number]  TIFFANY BOWLES  follow up:   COVID-19 pneumonia    still confused  not eating    Palliative care called.     =======================================================    REVIEW OF SYSTEMS:  Limited due to medical condition    =======================================================  Allergies  No Known Allergies     ======================================================  Physical Exam:  ============     General:  No acute distress.  Eye: BRYANNA EOMI  HENT: Normocephalic, Oral mucosa is moist, No pharyngeal erythema, No sinus tenderness.  forcefully closing eyes;  edentulous  Neck: Supple, No lymphadenopathy.  Respiratory: Lungs  with fair air entry posteriorly  Cardiovascular: Normal rate, Regular rhythm,   Gastrointestinal: Soft, Non-tender, Non-distended, Normal bowel sounds.  Genitourinary: No costovertebral angle tenderness.  Lymphatics: No lymphadenopathy neck,   Musculoskeletal: Normal range of motion, Normal strength.  Integumentary: No rash.  Neurologic: moves all extremities, Cranial Nerves II-XII are grossly intact.  Psychiatric: limited    =======================================================   Vitals:  ============  T(F): 98.1 (15 Dec 2020 08:10), Max: 98.2 (14 Dec 2020 16:24)  HR: 100 (15 Dec 2020 04:14)  BP: 91/52 (15 Dec 2020 08:10)  RR: 18 (15 Dec 2020 08:10)  SpO2: 97% (15 Dec 2020 04:14) (95% - 97%)  temp max in last 48H T(F): , Max: 99 (12-13-20 @ 16:21)    =======================================================  Current Antibiotics:  meropenem  IVPB 1000 milliGRAM(s) IV Intermittent every 12 hours    Other medications:  allopurinol 100 milliGRAM(s) Oral daily  amLODIPine   Tablet 7.5 milliGRAM(s) Oral daily  aspirin  chewable 81 milliGRAM(s) Oral daily  atorvastatin 10 milliGRAM(s) Oral at bedtime  dextrose 5%. 1000 milliLiter(s) IV Continuous <Continuous>  dextrose 5%. 1000 milliLiter(s) IV Continuous <Continuous>  enoxaparin Injectable 40 milliGRAM(s) SubCutaneous daily  memantine 10 milliGRAM(s) Oral two times a day  QUEtiapine 25 milliGRAM(s) Oral daily  saccharomyces boulardii 250 milliGRAM(s) Oral two times a day      =======================================================  Labs:     12-15    147<H>  |  113<H>  |  45.0<H>  ----------------------------<  134<H>  4.3   |  23.0  |  1.23    Ca    8.6      15 Dec 2020 07:12  Mg     2.3     12-15    TPro  6.4<L>  /  Alb  3.2<L>  /  TBili  0.7  /  DBili  x   /  AST  98<H>  /  ALT  64<H>  /  AlkPhos  66  12-15      Culture - Blood (collected 12-09-20 @ 08:32)  Source: .Blood Blood  Final Report (12-14-20 @ 09:01):    No growth at 5 days.    Culture - Blood (collected 12-09-20 @ 08:32)  Source: .Blood Blood  Final Report (12-14-20 @ 09:01):    No growth at 5 days.      Creatinine, Serum: 1.23 mg/dL (12-15-20 @ 07:12)  Creatinine, Serum: 1.28 mg/dL (12-14-20 @ 07:26)  Creatinine, Serum: 1.35 mg/dL (12-13-20 @ 09:50)  Creatinine, Serum: 1.74 mg/dL (12-12-20 @ 06:18)    Procalcitonin, Serum: 0.13 ng/mL (12-15-20 @ 07:12)  Procalcitonin, Serum: 0.26 ng/mL (12-13-20 @ 09:50)      Ferritin, Serum: 669 ng/mL (12-15-20 @ 07:12)  Ferritin, Serum: 736 ng/mL (12-13-20 @ 09:50)  Ferritin, Serum: 357 ng/mL (12-09-20 @ 19:25)    C-Reactive Protein, Serum: 0.97 mg/dL (12-15-20 @ 07:12)    WBC Count: 6.37 K/uL (12-13-20 @ 09:50)  WBC Count: 8.23 K/uL (12-12-20 @ 06:18)    SARS-CoV-2: Detected (12-09-20 @ 07:38)  Rapid RVP Result: Detected (12-09-20 @ 07:38)    COVID-19 IgG Antibody Interpretation: Negative (12-10-20 @ 07:06)  COVID-19 IgG Antibody Index: 0.21 Index (12-10-20 @ 07:06)    Lactate Dehydrogenase, Serum: 527 U/L (12-15-20 @ 07:12)  Lactate Dehydrogenase, Serum: 619 U/L (12-13-20 @ 09:50)  Lactate Dehydrogenase, Serum: 546 U/L (12-09-20 @ 19:25)    Alkaline Phosphatase, Serum: 66 U/L (12-15-20 @ 07:12)  Alkaline Phosphatase, Serum: 66 U/L (12-13-20 @ 09:50)  Alanine Aminotransferase (ALT/SGPT): 64 U/L (12-15-20 @ 07:12)  Alanine Aminotransferase (ALT/SGPT): 70 U/L (12-13-20 @ 09:50)  Aspartate Aminotransferase (AST/SGOT): 98 U/L (12-15-20 @ 07:12)  Aspartate Aminotransferase (AST/SGOT): 158 U/L (12-13-20 @ 09:50)  Bilirubin Total, Serum: 0.7 mg/dL (12-15-20 @ 07:12)  Bilirubin Total, Serum: 0.8 mg/dL (12-13-20 @ 09:50)

## 2020-12-15 NOTE — PROGRESS NOTE ADULT - SUBJECTIVE AND OBJECTIVE BOX
TIFFANY BOWLES    [id number]    80y      Male    CC: weakness     INTERVAL HPI/OVERNIGHT EVENTS: pt seen and examined.     REVIEW OF SYSTEMS:  unable to obtain 2/2 mental status     Vital Signs Last 24 Hrs  T(C): 36.7 (15 Dec 2020 08:10), Max: 36.8 (14 Dec 2020 16:24)  T(F): 98.1 (15 Dec 2020 08:10), Max: 98.2 (14 Dec 2020 16:24)  HR: 100 (15 Dec 2020 04:14) (95 - 114)  BP: 91/52 (15 Dec 2020 08:10) (91/52 - 156/85)  BP(mean): --  RR: 18 (15 Dec 2020 08:10) (18 - 20)  SpO2: 97% (15 Dec 2020 04:14) (95% - 97%)    PHYSICAL EXAM:    GENERAL: NAD  HEENT: +EOMI  CHEST/LUNG: respirations non-labored on RA   HEART: S1S2+, Regular rate and rhythm  ABDOMEN: Soft, Nontender, Nondistended; Bowel sounds present  SKIN: warm, dry  NEURO: awake, minimally verbal, not following commands    LABS:    12-15    147<H>  |  113<H>  |  45.0<H>  ----------------------------<  134<H>  4.3   |  23.0  |  1.23    Ca    8.6      15 Dec 2020 07:12  Mg     2.3     12-15    TPro  6.4<L>  /  Alb  3.2<L>  /  TBili  0.7  /  DBili  x   /  AST  98<H>  /  ALT  64<H>  /  AlkPhos  66  12-15        MEDICATIONS  (STANDING):  allopurinol 100 milliGRAM(s) Oral daily  amLODIPine   Tablet 7.5 milliGRAM(s) Oral daily  aspirin  chewable 81 milliGRAM(s) Oral daily  atorvastatin 10 milliGRAM(s) Oral at bedtime  dextrose 5%. 1000 milliLiter(s) (75 mL/Hr) IV Continuous <Continuous>  dextrose 5%. 1000 milliLiter(s) (75 mL/Hr) IV Continuous <Continuous>  enoxaparin Injectable 40 milliGRAM(s) SubCutaneous daily  memantine 10 milliGRAM(s) Oral two times a day  meropenem  IVPB 1000 milliGRAM(s) IV Intermittent every 12 hours  QUEtiapine 25 milliGRAM(s) Oral daily  saccharomyces boulardii 250 milliGRAM(s) Oral two times a day    MEDICATIONS  (PRN):  LORazepam   Injectable 1 milliGRAM(s) IV Push every 6 hours PRN Agitation      RADIOLOGY & ADDITIONAL TESTS:

## 2020-12-15 NOTE — CONSULT NOTE ADULT - SUBJECTIVE AND OBJECTIVE BOX
Palliative Medicine Initial Consultation Note    HPI:  History from chart- poor historian  81 y/o male with hx of dementia, HTN and HLD brought to ED due to a fall at home.  Per his wife for the past week the patient has been gradually getting worse. Due to his dementia he had his Seroquel increase to two tabs in the am and one at night around thanksgiving. They noted that it seemed to make him more confused and unsteady. They stopped the medication and his symptoms progressed. Patient is much more unsteady/shakier on his feet, unable to feed himself, and is having more trouble treating understanding/ following commands. She states this morning she noted that the patient was sitting at the bedside. She dozed off, next minute heard a crash and pt was found on the floor. In ED. pt evaluated, no trauma appreciated. Work up indicate a right sided PNA. Pt admitted for failure to thrive with PNA.    PERTINENT PMH REVIEWED:  [ x] YES [ ] NO         Dementia without behavioral disturbance, unspecified dementia type     Essential hypertension.     PAST SURGICAL HISTORY:  H/O hernia repair.     SOCIAL HISTORY:   unable to obtain - poor history                                Admitted from: [ ] home [ ] SNF _________ [ ] [facility] ________    FAMILY HISTORY:  Unable to obtain - poor historian        Baseline ADLs (prior to admission):  Independent [ ] moderately [x ] fully   Dependent   [ ] moderately [ ]fully    MEDICATIONS  (STANDING):  allopurinol 100 milliGRAM(s) Oral daily  amLODIPine   Tablet 7.5 milliGRAM(s) Oral daily  aspirin  chewable 81 milliGRAM(s) Oral daily  atorvastatin 10 milliGRAM(s) Oral at bedtime  dextrose 5%. 1000 milliLiter(s) (75 mL/Hr) IV Continuous <Continuous>  dextrose 5%. 1000 milliLiter(s) (75 mL/Hr) IV Continuous <Continuous>  enoxaparin Injectable 40 milliGRAM(s) SubCutaneous daily  memantine 10 milliGRAM(s) Oral two times a day  meropenem  IVPB 1000 milliGRAM(s) IV Intermittent every 12 hours  QUEtiapine 25 milliGRAM(s) Oral daily  saccharomyces boulardii 250 milliGRAM(s) Oral two times a day    MEDICATIONS  (PRN):  LORazepam   Injectable 1 milliGRAM(s) IV Push every 6 hours PRN Agitation      Allergies    No Known Allergies    Intolerances      REVIEW OF SYSTEMS       [x ] Unable to obtain due to poor mentation     Karnofsky Performance Score/Palliative Performance Status Version 2:         %    Vital Signs Last 24 Hrs  T(C): 36.7 (15 Dec 2020 08:10), Max: 36.8 (14 Dec 2020 16:24)  T(F): 98.1 (15 Dec 2020 08:10), Max: 98.2 (14 Dec 2020 16:24)  HR: 100 (15 Dec 2020 04:14) (95 - 114)  BP: 91/52 (15 Dec 2020 08:10) (91/52 - 156/85)  BP(mean): --  RR: 18 (15 Dec 2020 08:10) (18 - 20)  SpO2: 97% (15 Dec 2020 04:14) (95% - 97%)    PHYSICAL EXAM:  Physical Exam:  Due to the nature of this patient's COVID-19 isolation status, no bedside physical exam was done to limit spread of infection. Examination highlights were provided by bedside nurse. Objective data were reviewed in detail      LABS:    12-15    147<H>  |  113<H>  |  45.0<H>  ----------------------------<  134<H>  4.3   |  23.0  |  1.23    Ca    8.6      15 Dec 2020 07:12  Mg     2.3     12-15    TPro  6.4<L>  /  Alb  3.2<L>  /  TBili  0.7  /  DBili  x   /  AST  98<H>  /  ALT  64<H>  /  AlkPhos  66  12-15        I&O's Summary    14 Dec 2020 07:01  -  15 Dec 2020 07:00  --------------------------------------------------------  IN: 850 mL / OUT: 0 mL / NET: 850 mL        RADIOLOGY & ADDITIONAL STUDIES:    < from: Xray Chest 1 View- PORTABLE-Routine (Xray Chest 1 View- PORTABLE-Routine .) (12.14.20 @ 12:23) >   EXAM:  XR CHEST PORTABLE ROUTINE 1V                          PROCEDURE DATE:  12/14/2020          INTERPRETATION:  Chest one view    HISTORY: Pneumonia    COMPARISON STUDY: 12/11/2020    Frontal expiratory view of the chest shows the heart to be normal in size. The lungs show small patchy infiltrates of the left base and there is no evidence of pneumothorax nor pleural effusion.    IMPRESSION:  Small left infiltrates.    Thank you for the courtesy of this referral.      < end of copied text >        ADVANCE DIRECTIVES:  [x ] YES [ ] NO   DNR [ x] YES [ ] NO  Completed on:                     MOLST  [ ] YES [ ] NO   Completed on:  Living Will  [ ] YES [ ] NO   Completed on:

## 2020-12-15 NOTE — PROGRESS NOTE ADULT - ASSESSMENT
This 79 y/o male with hx of dementia, HTN and HLD brought to ED due to a fall at home.  Per his wife for the past week the patient has been gradually getting worse. Due to his dementia he had his Seroquel increase to two tabs in the am and one at night around thanksgiving. They noted that it seemed to make him more confused and unsteady. They stopped the medication and his symptoms progressed. Patient is much more unsteady/shakier on his feet, unable to feed himself, and is having more trouble treating understanding/ following commands. She states this morning she noted that the patient was sitting at the bedside. She dozed off, next minute heard a crash and pt was found on the floor. In ED. pt evaluated, no trauma appreciated. Work up indicate a right sided PNA. Pt admitted for failure to thrive with PNA. (09 Dec 2020 09:34)    Patient cannot provide any history.     CXR reviewed.  SARS-2 testing is positive.    Impression:  viral pneumonia  COVID-19 infection  dementia  confusion  lung infiltrates    Plan:  - currently not with SOB or oxygen requirement  - low grade temps noted.   not currently on oxygen    - currently on MERREM; started on 12/11 -   will plan for a 5 day course  thru 12/16, after AM dose    LEGIONELLA NEGATIVE; off AZITHRO    remains stable on room air  defer Remdesivir for now;      - Continue dexamethasone 6mg daily x 5 days given    - Continue supportive care measures  - continue Oxygenation as needed;         Palliative care consulted  for goals of care      No further specific infectious disease recommendations. Will be available as needed.

## 2020-12-15 NOTE — PROGRESS NOTE ADULT - ASSESSMENT
80y/oM PMH dementia, HTN, HLD brought to ER s/p fall at home     Failure to thrive   COVID-19 PNA   Probably superimposed aspiration PNA (2/2 gram negative organism)  Acute infectious encephalopathy with Alzheimer's dementia   Dysphagia   Dementia with behavioral disturbance   -CT chest w/multifocal pna  -supportive care   -ID recs appreciated   -Ceftriaxone changed to Merrem   -legionella negative, azithromycin stopped   -cont wrist restraints for safety   -prn ativan  -cont dexamethasone   -wean supplemental O2 as tolerated, on RA-2LNC  -remdesivir deferred for now  -failed swallow eval again 12/12, pt remains NPO    Leukopenia, thrombocytopenia   -likely 2/2 COVID infection     ROCKY on CKD 3  -avoid nephrotoxins   -monitor sCr   -on IVF     Hypernatremia   -IVF adjusted     HTN   -cont norvasc     DVT ppx: cont lovenox sq     Prognosis guarded   Dispo: pending clinical improvement, pt remains NPO     pt's daughter, eun [phone] updated. d/w Dr. Fair as well regarding comfort feeds. family to discuss further today regarding comfort feeds. considering home with hospice vs LTC/SNF 80y/oM PMH dementia, HTN, HLD brought to ER s/p fall at home     Failure to thrive   COVID-19 PNA   Probably superimposed aspiration PNA (2/2 gram negative organism)  Acute infectious encephalopathy with Alzheimer's dementia   Dysphagia   Dementia with behavioral disturbance   -CT chest w/multifocal pna  -supportive care   -ID recs appreciated   -Ceftriaxone changed to Merrem   -legionella negative, azithromycin stopped   -cont wrist restraints for safety   -prn ativan  -cont dexamethasone   -wean supplemental O2 as tolerated, on RA-2LNC  -remdesivir deferred for now  -failed swallow eval again 12/12, pt remains NPO    Leukopenia, thrombocytopenia   -likely 2/2 COVID infection     ROCKY on CKD 3  -avoid nephrotoxins   -monitor sCr   -on IVF     Hypernatremia   -IVF adjusted     HTN   -not receiving norvasc due to NPO    DVT ppx: cont lovenox sq     Prognosis guarded   Dispo: pending clinical improvement, pt remains NPO     pt's daughter, eun [phone] updated. d/w Dr. Fair as well regarding comfort feeds. family to discuss further today regarding comfort feeds. considering home with hospice vs LTC/SNF

## 2020-12-15 NOTE — CONSULT NOTE ADULT - PROBLEM SELECTOR RECOMMENDATION 3
Failed SLP eval    This is a functional decline related to disease ( dementia) and recent infection.  Discussed comfort feeds with family  If family agreeable to comfort feeds - would place on puree with thickened liquids.

## 2020-12-15 NOTE — CONSULT NOTE ADULT - PROBLEM SELECTOR RECOMMENDATION 4
Spoke to wife and daughter separately.   Wife informs me that patient has HHA assisting  with all ADLs.   Informed them of current condition.  Discussed risks/benefits of comfort feeds.   Spoke with daughter separately about home hospice.  They are also looking into SNF placement.    Family to decide disposition - home with hospice vs LTC/SNF, as well as comfort feeds.

## 2020-12-15 NOTE — CONSULT NOTE ADULT - PROBLEM SELECTOR RECOMMENDATION 2
Patient at baseline  needing assistance in all ADLs has HHA 7hrs/day.  Patient will likely have further decline after this hospitalization   Hospice appropriate

## 2020-12-16 NOTE — PROGRESS NOTE ADULT - ASSESSMENT
80y/oM PMH dementia, HTN, HLD brought to ER s/p fall at home     Failure to thrive   COVID-19 PNA   Probably superimposed aspiration PNA (2/2 gram negative organism)  Acute infectious encephalopathy with Alzheimer's dementia   Dysphagia   Dementia with behavioral disturbance   -CT chest w/multifocal pna  -supportive care   -ID recs appreciated   -Ceftriaxone changed to Merrem   -legionella negative, azithromycin stopped   -cont wrist restraints for safety   -prn ativan  -cont dexamethasone   -wean supplemental O2 as tolerated, on RA-2LNC  -remdesivir deferred for now  -failed swallow eval again 12/12, pt remains NPO    Leukopenia, thrombocytopenia   -likely 2/2 COVID infection     ROCKY on CKD 3  -avoid nephrotoxins   -monitor sCr   -on IVF     Hypernatremia   -IVF adjusted     HTN   -not receiving norvasc due to NPO    DVT ppx: cont lovenox sq     Prognosis guarded   Dispo: home with hospice vs snf with hospice.    palliative and hospice following. family deciding on dispo

## 2020-12-16 NOTE — GOALS OF CARE CONVERSATION - ADVANCED CARE PLANNING - TREATMENT GUIDELINE COMMENT
D/C non essential meds.  Comfort feeds  Family to decide home with hospice vs SNF    Informed logistics - hospice referral, as well as investigate  resources for SNF D/C non essential meds.  Comfort feeds  Family to decide home with hospice vs SNF    Informed logistics - hospice referral, as well as investigate  resources for SNF                                          ACP 20 min

## 2020-12-16 NOTE — GOALS OF CARE CONVERSATION - ADVANCED CARE PLANNING - CONVERSATION DETAILS
Called to follow up on previous conversation. Family to decide home hospice vs SNF, and comfort feeds  Daughter Nicky  inquired inpatient hospice for which I informed her patient current NOT appropriate, but can change. Discussed home hospice services but recommended to discuss with hospice in detail. Nicky concerned that mother will not be able to care for father given level of need.  May need to consider SNF but Nicky also feels father would want to be home.    Discussed risks benefits of comfort feed and feeding tubes. Nicky states she knows father would not want that and consents to Comfort feeds.

## 2020-12-16 NOTE — CHART NOTE - NSCHARTNOTEFT_GEN_A_CORE
Source: Patient [ ]  Family [ ]   other [x ]    Current Diet: Diet, NPO (12-09-20 @ 15:01)    PO intake:  Pt remains NPO due to difficulty swallowing    Current Weight:   (12/9) 171 lbs    % Weight Change no new weight to assess, will continue to monitor.  Aware pt with 2+ mild left arm edema noted on (12/12).    Pertinent Medications: MEDICATIONS  (STANDING):  allopurinol 100 milliGRAM(s) Oral daily  amLODIPine   Tablet 7.5 milliGRAM(s) Oral daily  aspirin  chewable 81 milliGRAM(s) Oral daily  atorvastatin 10 milliGRAM(s) Oral at bedtime  dextrose 5%. 1000 milliLiter(s) (75 mL/Hr) IV Continuous <Continuous>  enoxaparin Injectable 40 milliGRAM(s) SubCutaneous daily  memantine 10 milliGRAM(s) Oral two times a day  QUEtiapine 25 milliGRAM(s) Oral daily  saccharomyces boulardii 250 milliGRAM(s) Oral two times a day    MEDICATIONS  (PRN):  LORazepam   Injectable 1 milliGRAM(s) IV Push every 6 hours PRN Agitation    Pertinent Labs: 12-15 Na147 mmol/L<H> Glu 134 mg/dL<H> K+ 4.3 mmol/L Cr  1.23 mg/dL BUN 45.0 mg/dL<H> Phos n/a   Alb 3.2 g/dL<L> PAB n/a       Skin: no skin breakdown noted     Current Nutrition Diagnosis:  Pt remains at nutrition risk secondary moderate protein calorie malnutrition (acute- likely chronic) related to inability to consume sufficient protein energy intake due to NPO x7 days with dysphagia in setting of advanced dementia, failure to thrive and +COVID.   Pt remains NPO- aware palliative care following for GOC.  Pt hospice appropriate per palliative- awaiting family decision on comfort feeds.      Recommendations:   1. RD to honor pt/family wishes regarding nutrition care (?comfort feeds)  2. RX: Vit C and Vit D supplementation daily  3. Monitor daily wts     Monitoring and Evaluation:   [ ] PO intake [x ] Tolerance to diet prescription [X] Weights  [X] Follow up per protocol [X] Labs:

## 2020-12-16 NOTE — PROGRESS NOTE ADULT - ASSESSMENT
80yr man, hx of advanced dementia - non ambulatory, minimally verbal, HTN admitted with COVID PNA with further functional decline     Problem/Recommendation - 1:  Problem: Pneumonia due to COVID-19 virus. Recommendation: patient on room air  supportive care.     Problem/Recommendation - 2:  ·  Problem: Dementia without behavioral disturbance, unspecified dementia type.  Recommendation: Patient at baseline  needing assistance in all ADLs has HHA 7hrs/day.  Patient will likely have further decline after this hospitalization   Hospice appropriate.      Problem/Recommendation - 3:  ·  Problem: Dysphagia.  Recommendation: Failed SLP eval    This is a functional decline related to disease ( dementia) and recent infection.  Discussed comfort feeds with family  If family agreeable to comfort feeds - would place on puree with thickened liquids.      Problem/Recommendation - 4:  ·  Problem: Encounter for palliative care 80yr man, hx of advanced dementia - non ambulatory, minimally verbal, HTN admitted with COVID PNA with further functional decline     Problem/Recommendation - 1:  Problem: Pneumonia due to COVID-19 virus. Recommendation: patient on room air  supportive care.     Problem/Recommendation - 2:  ·  Problem: Dementia without behavioral disturbance, unspecified dementia type.  Recommendation: Patient at baseline  needing assistance in all ADLs has HHA 7hrs/day.  Patient will likely have further decline after this hospitalization   Hospice appropriate.      Problem/Recommendation - 3:  ·  Problem: Dysphagia.  Recommendation: Failed SLP eval    This is a functional decline related to disease ( dementia) and recent infection.  Discussed comfort feeds with family  Family agreed to comfort feeds     Problem/Recommendation - 4:  ·  Problem: Encounter for palliative care  See GOC note in detail  1. Comfort feeds-  would recommend puree/ thickened liquids.  No further need for SLP eval  2. Family wishes to know about home hospice services vs SNF/LTC  3. D/c non essential meds to decrease pill burden  4. Family wants him COVID RETESTED if to go home.

## 2020-12-16 NOTE — PROGRESS NOTE ADULT - SUBJECTIVE AND OBJECTIVE BOX
CC:  follow up  GOC  INTERVAL HPI/OVERNIGHT EVENTS:    PRESENT SYMPTOMS: SOURCE:  Patient/Family/Team    PAIN SCALE:  0 = none  1 = mild   2 = moderate  3 = severe    Pain:     Dyspnea:  [ ] YES [ ] NO  Anxiety:  [ ] YES [ ] NO  Fatigue: [ ] YES [ ] NO  Nausea: [ ] YES [ ] NO  Loss of Appetite: [ ] YES [ ] NO  Other symptoms: __________    MEDICATIONS  (STANDING):  allopurinol 100 milliGRAM(s) Oral daily  amLODIPine   Tablet 7.5 milliGRAM(s) Oral daily  aspirin  chewable 81 milliGRAM(s) Oral daily  atorvastatin 10 milliGRAM(s) Oral at bedtime  dextrose 5%. 1000 milliLiter(s) (75 mL/Hr) IV Continuous <Continuous>  enoxaparin Injectable 40 milliGRAM(s) SubCutaneous daily  memantine 10 milliGRAM(s) Oral two times a day  QUEtiapine 25 milliGRAM(s) Oral daily  saccharomyces boulardii 250 milliGRAM(s) Oral two times a day    MEDICATIONS  (PRN):  LORazepam   Injectable 1 milliGRAM(s) IV Push every 6 hours PRN Agitation      Allergies    No Known Allergies    Intolerances    Karnofsky Performance Score/Palliative Performance Status Version 2:         %    Vital Signs Last 24 Hrs  T(C): 36.8 (16 Dec 2020 08:02), Max: 36.8 (16 Dec 2020 08:02)  T(F): 98.3 (16 Dec 2020 08:02), Max: 98.3 (16 Dec 2020 08:02)  HR: 109 (16 Dec 2020 08:02) (90 - 109)  BP: 108/61 (16 Dec 2020 08:02) (93/53 - 125/80)  BP(mean): --  RR: 17 (16 Dec 2020 08:02) (16 - 20)  SpO2: 94% (16 Dec 2020 08:02) (92% - 94%)    PHYSICAL EXAM:    General: [ ] alert  [ ] oriented x ____ [ ] lethargic [ ] agitated                  [ ] cachexia  [ ] nonverbal  [ ] coma    HEENT: [ ] normal  [ ] dry mouth  [ ] ET tube/trach    Lungs: [ ] comfortable [ ] tachypnea/labored breathing  [ ] excessive secretions    CV: [ ] normal  [ ] tachycardia    GI: [ ] normal  [ ] distended  [ ] tender  [ ] no BS               [ ] PEG/NG/OG tube    : [ ] normal  [ ] incontinent  [ ] oliguria/anuria  [ ] lucia    MSK: [ ] normal  [ ] weakness  [ ] edema             [ ] ambulatory  [ ] bedbound/wheelchair bound    Skin: [ ] normal  [ ] pressure ulcers- Stage_____  [ ] no rash    LABS:    12-15    147<H>  |  113<H>  |  45.0<H>  ----------------------------<  134<H>  4.3   |  23.0  |  1.23    Ca    8.6      15 Dec 2020 07:12  Mg     2.2     12-16    TPro  6.4<L>  /  Alb  3.2<L>  /  TBili  0.7  /  DBili  x   /  AST  98<H>  /  ALT  64<H>  /  AlkPhos  66  12-15        I&O's Summary      RADIOLOGY & ADDITIONAL STUDIES:     CC:  follow up  GOC  INTERVAL HPI/OVERNIGHT EVENTS:  none  PRESENT SYMPTOMS: SOURCE:  Patient/Family/Team    PAIN SCALE:  0 = none  1 = mild   2 = moderate  3 = severe    Pain:     Dyspnea:  [ ] YES [ ] NO  Anxiety:  [ ] YES [ ] NO  Fatigue: [ ] YES [ ] NO  Nausea: [ ] YES [ ] NO  Loss of Appetite: [ ] YES [ ] NO  Other symptoms: __________    MEDICATIONS  (STANDING):  allopurinol 100 milliGRAM(s) Oral daily  amLODIPine   Tablet 7.5 milliGRAM(s) Oral daily  aspirin  chewable 81 milliGRAM(s) Oral daily  atorvastatin 10 milliGRAM(s) Oral at bedtime  dextrose 5%. 1000 milliLiter(s) (75 mL/Hr) IV Continuous <Continuous>  enoxaparin Injectable 40 milliGRAM(s) SubCutaneous daily  memantine 10 milliGRAM(s) Oral two times a day  QUEtiapine 25 milliGRAM(s) Oral daily  saccharomyces boulardii 250 milliGRAM(s) Oral two times a day    MEDICATIONS  (PRN):  LORazepam   Injectable 1 milliGRAM(s) IV Push every 6 hours PRN Agitation      Allergies    No Known Allergies    Intolerances    Karnofsky Performance Score/Palliative Performance Status Version 2:         %  Physical Exam:  Due to the nature of this patient's COVID-19 isolation status, no bedside physical exam was done to limit spread of infection. Examination highlights were provided by bedside nurse. Objective data were reviewed in detail    Vital Signs Last 24 Hrs  T(C): 36.8 (16 Dec 2020 08:02), Max: 36.8 (16 Dec 2020 08:02)  T(F): 98.3 (16 Dec 2020 08:02), Max: 98.3 (16 Dec 2020 08:02)  HR: 109 (16 Dec 2020 08:02) (90 - 109)  BP: 108/61 (16 Dec 2020 08:02) (93/53 - 125/80)  BP(mean): --  RR: 17 (16 Dec 2020 08:02) (16 - 20)  SpO2: 94% (16 Dec 2020 08:02) (92% - 94%)  LABS:    12-15    147<H>  |  113<H>  |  45.0<H>  ----------------------------<  134<H>  4.3   |  23.0  |  1.23    Ca    8.6      15 Dec 2020 07:12  Mg     2.2     12-16    TPro  6.4<L>  /  Alb  3.2<L>  /  TBili  0.7  /  DBili  x   /  AST  98<H>  /  ALT  64<H>  /  AlkPhos  66  12-15        I&O's Summary      RADIOLOGY & ADDITIONAL STUDIES:

## 2020-12-16 NOTE — CHART NOTE - NSCHARTNOTEFT_GEN_A_CORE
Upon Nutritional Assessment by the Registered Dietitian your patient was determined to meet criteria / has evidence of the following diagnosis/diagnoses:          [ ]  Mild Protein Calorie Malnutrition        [x ]  Moderate Protein Calorie Malnutrition        [ ] Severe Protein Calorie Malnutrition        [ ] Unspecified Protein Calorie Malnutrition        [ ] Underweight / BMI <19        [ ] Morbid Obesity / BMI > 40      Findings as based on:  •  Comprehensive nutrition assessment and consultation  •  Calorie counts (nutrient intake analysis)  •  Food acceptance and intake status from observations by staff  •  Follow up  •  Patient education  •  Intervention secondary to interdisciplinary rounds  •   concerns      Treatment:    The following diet has been recommended:  RD to honor pt/family wishes regarding nutrition (? comfort feeds)    PROVIDER Section:     By signing this assessment you are acknowledging and agree with the diagnosis/diagnoses assigned by the Registered Dietitian    Comments:

## 2020-12-16 NOTE — PROGRESS NOTE ADULT - SUBJECTIVE AND OBJECTIVE BOX
TIFFANY BOWLES    [id number]    80y      Male    CC:     INTERVAL HPI/OVERNIGHT EVENTS:    REVIEW OF SYSTEMS:        Vital Signs Last 24 Hrs  T(C): 36.6 (16 Dec 2020 16:38), Max: 36.8 (16 Dec 2020 08:02)  T(F): 97.9 (16 Dec 2020 16:38), Max: 98.3 (16 Dec 2020 08:02)  HR: 73 (16 Dec 2020 16:38) (73 - 109)  BP: 96/51 (16 Dec 2020 16:38) (93/53 - 125/80)  BP(mean): --  RR: 16 (16 Dec 2020 16:38) (16 - 18)  SpO2: 94% (16 Dec 2020 16:38) (92% - 94%)    PHYSICAL EXAM:        LABS:    12-15    147<H>  |  113<H>  |  45.0<H>  ----------------------------<  134<H>  4.3   |  23.0  |  1.23    Ca    8.6      15 Dec 2020 07:12  Mg     2.2     12-16    TPro  6.4<L>  /  Alb  3.2<L>  /  TBili  0.7  /  DBili  x   /  AST  98<H>  /  ALT  64<H>  /  AlkPhos  66  12-15            MEDICATIONS  (STANDING):  amLODIPine   Tablet 7.5 milliGRAM(s) Oral daily  dextrose 5%. 1000 milliLiter(s) (75 mL/Hr) IV Continuous <Continuous>  enoxaparin Injectable 40 milliGRAM(s) SubCutaneous daily  QUEtiapine 25 milliGRAM(s) Oral daily    MEDICATIONS  (PRN):  LORazepam   Injectable 1 milliGRAM(s) IV Push every 6 hours PRN Agitation      RADIOLOGY & ADDITIONAL TESTS:   TIFFANY BOWLES    [id number]    80y      Male    CC: failure to thrive    INTERVAL HPI/OVERNIGHT EVENTS: pt seen and examined. no acute events o/n    REVIEW OF SYSTEMS:  unable to obtain 2/2 mental status      Vital Signs Last 24 Hrs  T(C): 36.6 (16 Dec 2020 16:38), Max: 36.8 (16 Dec 2020 08:02)  T(F): 97.9 (16 Dec 2020 16:38), Max: 98.3 (16 Dec 2020 08:02)  HR: 73 (16 Dec 2020 16:38) (73 - 109)  BP: 96/51 (16 Dec 2020 16:38) (93/53 - 125/80)  BP(mean): --  RR: 16 (16 Dec 2020 16:38) (16 - 18)  SpO2: 94% (16 Dec 2020 16:38) (92% - 94%)    PHYSICAL EXAM:  GENERAL: NAD  HEENT: +EOMI  CHEST/LUNG: respirations non-labored on RA   HEART: S1S2+, Regular rate and rhythm  ABDOMEN: Soft, Nontender, Nondistended; Bowel sounds present  SKIN: warm, dry  NEURO: awake, minimally verbal, not following commands      LABS:    12-15    147<H>  |  113<H>  |  45.0<H>  ----------------------------<  134<H>  4.3   |  23.0  |  1.23    Ca    8.6      15 Dec 2020 07:12  Mg     2.2     12-16    TPro  6.4<L>  /  Alb  3.2<L>  /  TBili  0.7  /  DBili  x   /  AST  98<H>  /  ALT  64<H>  /  AlkPhos  66  12-15            MEDICATIONS  (STANDING):  amLODIPine   Tablet 7.5 milliGRAM(s) Oral daily  dextrose 5%. 1000 milliLiter(s) (75 mL/Hr) IV Continuous <Continuous>  enoxaparin Injectable 40 milliGRAM(s) SubCutaneous daily  QUEtiapine 25 milliGRAM(s) Oral daily    MEDICATIONS  (PRN):  LORazepam   Injectable 1 milliGRAM(s) IV Push every 6 hours PRN Agitation      RADIOLOGY & ADDITIONAL TESTS:

## 2020-12-17 NOTE — PROGRESS NOTE ADULT - SUBJECTIVE AND OBJECTIVE BOX
TIFFANY BOWLES    [id number]    80y      Male    CC:     INTERVAL HPI/OVERNIGHT EVENTS: no overnight events    Chart reviewed  Pt seen and examined at bedside  Laying in bed, sleeping, does not respond to questions, appears calm  Unable to obtain ROS    Vital Signs Last 24 Hrs  T(C): 36.6 (17 Dec 2020 08:31), Max: 37.1 (17 Dec 2020 04:52)  T(F): 97.8 (17 Dec 2020 08:31), Max: 98.7 (17 Dec 2020 04:52)  HR: 55 (17 Dec 2020 08:31) (55 - 82)  BP: 101/63 (17 Dec 2020 08:31) (96/51 - 119/66)  BP(mean): --  RR: 19 (17 Dec 2020 08:31) (16 - 19)  SpO2: 95% (17 Dec 2020 08:31) (94% - 95%) on RA    PHYSICAL EXAM:  GENERAL: NAD  HEENT: +EOMI  CHEST/LUNG: Respirations non-labored on RA   HEART: S1S2+, Regular rate and rhythm  ABDOMEN: Soft, Nontender, Nondistended; Bowel sounds present  SKIN: Warm, dry  NEURO: Sleeping, minimally verbal, not following commands    LABS:    12-17    145  |  113<H>  |  51.0<H>  ----------------------------<  110<H>  3.9   |  21.0<L>  |  1.28    Ca    8.8      17 Dec 2020 06:32  Mg     2.3     12-17      MEDICATIONS  (STANDING):  amLODIPine   Tablet 7.5 milliGRAM(s) Oral daily  dextrose 5%. 1000 milliLiter(s) (75 mL/Hr) IV Continuous <Continuous>  enoxaparin Injectable 40 milliGRAM(s) SubCutaneous daily  QUEtiapine 25 milliGRAM(s) Oral daily    MEDICATIONS  (PRN):  LORazepam   Injectable 1 milliGRAM(s) IV Push every 6 hours PRN Agitation

## 2020-12-17 NOTE — PROGRESS NOTE ADULT - ASSESSMENT
80y/oM PMH dementia, HTN, HLD brought to ER s/p fall at home     1. COVID PNA  -Probably superimposed aspiration PNA (2/2 gram negative organism)  -CT chest w/multifocal PNA   -ID recs appreciated   -Ceftriaxone changed to Merrem   -Legionella negative, azithromycin stopped   -Cont wrist restraints for safety   -Prn ativan  -Cont dexamethasone, on room air   -Remdesivir deferred for now    2. Acute infectious encephalopathy with Alzheimer's dementia   -Multifactorial, worsening dementia w/ behavioral disturbance, viral illness  -Pt at baseline needing assistance in all ADLS and has HHA 7hrs/day  -Supportive care  -Dysphagic and failed multiple swallow evals  -Ongoing palliative discussions, as of yesterday pts family in agreement to comfort feeds, diet changed to pureed w/ nectar. Hospice appropriate, further family discussions per palliative     3. Leukopenia, thrombocytopenia   -Likely 2/2 COVID infection     4.ROCKY on CKD 3  -Avoid nephrotoxins   -Monitor sCr   -Resolved, c/w IVF    5. Hypernatremia   -Resolved, Na 145  -IVF adjusted     6. HTN   -Was not receiving norvasc due to NPO, now on comfort feeds however BP stable off meds     DVT ppx: cont lovenox sq     Prognosis guarded     Ongoing palliative discussions with family. Family to discuss further today regarding hospice decisions.   Pt's daughter eun can be reached at [phone]  80y/oM PMH dementia, HTN, HLD brought to ER s/p fall at home     1. COVID PNA  -Probably superimposed aspiration PNA (2/2 gram negative organism)  -CT chest w/multifocal PNA   -ID recs appreciated   -Ceftriaxone changed to Merrem   -Legionella negative, azithromycin stopped   -Cont wrist restraints for safety   -Prn ativan  -s/p dexamethasone, on room air   -Remdesivir deferred    2. Acute infectious encephalopathy with Alzheimer's dementia   -Multifactorial, worsening dementia w/ behavioral disturbance, viral illness  -Pt at baseline needing assistance in all ADLS and has HHA 7hrs/day  -Supportive care  -Dysphagic and failed multiple swallow evals  -Ongoing palliative discussions, as of yesterday pts family in agreement to comfort feeds, diet changed to pureed w/ nectar. Hospice appropriate, further family discussions per palliative     3. Leukopenia, thrombocytopenia   -Likely 2/2 COVID infection     4.ROCKY on CKD 3  -Avoid nephrotoxins   -Monitor sCr   -Resolved, c/w IVF    5. Hypernatremia   -Resolved, Na 145  -IVF adjusted     6. HTN   -Was not receiving norvasc due to NPO, now on comfort feeds however BP stable off meds     DVT ppx: cont lovenox sq     Prognosis guarded     Ongoing palliative discussions with family. Family to discuss further today regarding hospice decisions.   Pt's daughter eun can be reached at [phone]

## 2020-12-18 NOTE — PROGRESS NOTE ADULT - SUBJECTIVE AND OBJECTIVE BOX
TIFFANY BOWLES    [id number]    80y      Male    CC: failure to thrive     INTERVAL HPI/OVERNIGHT EVENTS: pt seen and examined. no acute events o/n    REVIEW OF SYSTEMS:  unable to obtain 2/2 mental status     Vital Signs Last 24 Hrs  T(C): 36.8 (18 Dec 2020 15:27), Max: 37.1 (18 Dec 2020 04:57)  T(F): 98.3 (18 Dec 2020 15:27), Max: 98.7 (18 Dec 2020 04:57)  HR: 82 (18 Dec 2020 15:27) (81 - 91)  BP: 127/63 (18 Dec 2020 15:27) (101/70 - 129/72)  BP(mean): --  RR: 20 (18 Dec 2020 15:27) (18 - 20)  SpO2: 95% (18 Dec 2020 15:27) (95% - 97%)    PHYSICAL EXAM:  GENERAL: NAD  HEENT: +EOMI  CHEST/LUNG: respirations non-labored on RA   HEART: S1S2+, Regular rate and rhythm  ABDOMEN: Soft, Nontender, Nondistended; Bowel sounds present  SKIN: warm, dry  NEURO: awake, minimally verbal, not following commands    LABS:    12-17    145  |  113<H>  |  51.0<H>  ----------------------------<  110<H>  3.9   |  21.0<L>  |  1.28    Ca    8.8      17 Dec 2020 06:32  Mg     2.3     12-17          MEDICATIONS  (STANDING):  amLODIPine   Tablet 7.5 milliGRAM(s) Oral daily  dextrose 5%. 1000 milliLiter(s) (75 mL/Hr) IV Continuous <Continuous>  enoxaparin Injectable 40 milliGRAM(s) SubCutaneous daily  QUEtiapine 25 milliGRAM(s) Oral daily    MEDICATIONS  (PRN):  LORazepam    Concentrate 1 milliGRAM(s) SubLingual every 6 hours PRN Agitation      RADIOLOGY & ADDITIONAL TESTS:

## 2020-12-18 NOTE — PROGRESS NOTE ADULT - ASSESSMENT
80y/oM PMH dementia, HTN, HLD brought to ER s/p fall at home     COVID PNA  -Probably superimposed aspiration PNA (2/2 gram negative organism)  -CT chest w/multifocal PNA   -ID recs appreciated   -Ceftriaxone changed to Merrem   -Legionella negative, azithromycin stopped   -Cont wrist restraints for safety   -Prn ativan  -s/p dexamethasone, on room air   -Remdesivir deferred    Acute infectious encephalopathy with Alzheimer's dementia   -Multifactorial, worsening dementia w/ behavioral disturbance, viral illness  -Pt at baseline needing assistance in all ADLS and has HHA 7hrs/day  -Supportive care  -Dysphagic and failed multiple swallow evals  -Ongoing palliative discussions; diet changed to pureed w/ nectar. Hospice appropriate, further family discussions per palliative     Leukopenia, thrombocytopenia   -Likely 2/2 COVID infection     ROCKY on CKD 3  -Avoid nephrotoxins   -Monitor sCr   -Resolved, c/w IVF    Hypernatremia   -Resolved, Na 145  -IVF adjusted     HTN   -Was not receiving norvasc due to NPO, now on comfort feeds however BP stable off meds     DVT ppx: cont lovenox sq     Prognosis guarded      80y/oM PMH dementia, HTN, HLD brought to ER s/p fall at home     COVID PNA  -Probably superimposed aspiration PNA (2/2 gram negative organism)  -CT chest w/multifocal PNA   -ID recs appreciated   -Ceftriaxone changed to Merrem   -Legionella negative, azithromycin stopped   -Cont wrist restraints for safety   -Prn ativan  -s/p dexamethasone, on room air   -Remdesivir deferred    Acute infectious encephalopathy with Alzheimer's dementia   -Multifactorial, worsening dementia w/ behavioral disturbance, viral illness  -Pt at baseline needing assistance in all ADLS and has HHA 7hrs/day  -Supportive care  -Dysphagic and failed multiple swallow evals  -Ongoing palliative discussions; diet changed to pureed w/ nectar. Hospice appropriate, further family discussions per palliative   -palliative following, started on ativan 1mg sublingual PRN   -if sublingual ineffective, change to IV, pt would then be appropriate for Inpatient Hospice as per Palliative     Leukopenia, thrombocytopenia   -Likely 2/2 COVID infection     ROCKY on CKD 3  -Avoid nephrotoxins   -Monitor sCr   -Resolved, c/w IVF    Hypernatremia   -Resolved, Na 145  -IVF adjusted     HTN   -Was not receiving norvasc due to NPO, now on comfort feeds however BP stable off meds     DVT ppx: cont lovenox sq     Prognosis guarded   Dispo: SNF with hospice pending placement

## 2020-12-18 NOTE — PROGRESS NOTE ADULT - ASSESSMENT
80yr man, hx of advanced dementia - non ambulatory, minimally verbal, HTN admitted with COVID PNA with further functional decline     Problem/Recommendation - 1:  Problem: Pneumonia due to COVID-19 virus. Recommendation: patient on room air  supportive care.     Problem/Recommendation - 2:  ·  Problem: Dementia without behavioral disturbance, unspecified dementia type.  Recommendation: Patient at baseline  needing assistance in all ADLs has HHA 7hrs/day.  Hospice appropriate.      Problem/Recommendation - 3:  ·  Problem: Dysphagia.  Recommendation: Failed SLP eval    This is a functional decline related to disease ( dementia) and recent infection.  Discussed comfort feeds with family  Family agreed to comfort feeds     Problem/Recommendation - 4:  ·  Problem: Encounter for palliative care  Family opting for nursing home with hospice  Added Ativan 1mg s/l for agitation PRN  If sublingual Ativan not effective, then change to IVP - patient would then be appropriate for Hospice Inpatient unit

## 2020-12-18 NOTE — PROGRESS NOTE ADULT - SUBJECTIVE AND OBJECTIVE BOX
CC:  follow  up symptoms GOC  INTERVAL HPI/OVERNIGHT EVENTS:    PRESENT SYMPTOMS: SOURCE:  Patient/Family/Team    PAIN SCALE:  0 = none  1 = mild   2 = moderate  3 = severe    Pain:     Dyspnea:  [ ] YES [x ] NO  Anxiety:  [ ] YES [ ] NO some agitation   Fatigue: [x ] YES [ ] NO  Nausea: [ ] YES [ x] NO  Loss of Appetite: [x ] YES [ ] NO  Other symptoms: __________    MEDICATIONS  (STANDING):  amLODIPine   Tablet 7.5 milliGRAM(s) Oral daily  dextrose 5%. 1000 milliLiter(s) (75 mL/Hr) IV Continuous <Continuous>  enoxaparin Injectable 40 milliGRAM(s) SubCutaneous daily  QUEtiapine 25 milliGRAM(s) Oral daily    MEDICATIONS  (PRN):  LORazepam    Concentrate 1 milliGRAM(s) SubLingual every 6 hours PRN Agitation      Allergies    No Known Allergies    Intolerances    Karnofsky Performance Score/Palliative Performance Status Version 2:   30  %    Vital Signs Last 24 Hrs  T(C): 36.6 (18 Dec 2020 10:09), Max: 37.1 (18 Dec 2020 04:57)  T(F): 97.9 (18 Dec 2020 10:09), Max: 98.7 (18 Dec 2020 04:57)  HR: 81 (18 Dec 2020 10:09) (81 - 101)  BP: 104/67 (18 Dec 2020 10:09) (101/70 - 129/72)  BP(mean): --  RR: 18 (18 Dec 2020 10:09) (18 - 19)  SpO2: 97% (18 Dec 2020 10:09) (95% - 97%)    PHYSICAL EXAM:    General: elderly man confused NAD  HEENT: [x ] normal  [ ] dry mouth  [ ] ET tube/trach    Lungs: [x ] comfortable [ ] tachypnea/labored breathing  [ ] excessive secretions    CV: [x ] normal  [ ] tachycardia    GI: [x ] normal  [ ] distended  [ ] tender  [ ] no BS               [ ] PEG/NG/OG tube    : [ ] normal  [x ] incontinent  [ ] oliguria/anuria  [ ] lucia    MSK: [ ] normal  [ x] weakness  [ ] edema             [ ] ambulatory  [x ] bedbound/wheelchair bound    Skin: [ ] normal  [ ] pressure ulcers- Stage_____  [ x] no rash    LABS:    12-17    145  |  113<H>  |  51.0<H>  ----------------------------<  110<H>  3.9   |  21.0<L>  |  1.28    Ca    8.8      17 Dec 2020 06:32  Mg     2.3     12-17          I&O's Summary      RADIOLOGY & ADDITIONAL STUDIES:

## 2020-12-19 NOTE — PROGRESS NOTE ADULT - SUBJECTIVE AND OBJECTIVE BOX
TIFFANY BOWLES    [id number]    80y      Male    CC: failure to thrive    INTERVAL HPI/OVERNIGHT EVENTS: pt seen and examined. no acute events o/n. pt    REVIEW OF SYSTEMS:  unable to obtain 2/2 mental status      Vital Signs Last 24 Hrs  T(C): 36.9 (19 Dec 2020 16:04), Max: 36.9 (19 Dec 2020 05:45)  T(F): 98.5 (19 Dec 2020 16:04), Max: 98.5 (19 Dec 2020 16:04)  HR: 98 (19 Dec 2020 16:04) (94 - 100)  BP: 149/82 (19 Dec 2020 16:04) (97/63 - 149/82)  BP(mean): --  RR: 19 (19 Dec 2020 16:04) (18 - 20)  SpO2: 92% (19 Dec 2020 16:04) (91% - 98%)    PHYSICAL EXAM:    GENERAL: NAD  HEENT: +EOMI  CHEST/LUNG: respirations non-labored on RA   HEART: S1S2+, Regular rate and rhythm  ABDOMEN: Soft, Nontender, Nondistended; Bowel sounds present  SKIN: warm, dry  NEURO: awake, minimally verbal, not following commands    LABS:        MEDICATIONS  (STANDING):  amLODIPine   Tablet 7.5 milliGRAM(s) Oral daily  dextrose 5%. 1000 milliLiter(s) (75 mL/Hr) IV Continuous <Continuous>  enoxaparin Injectable 40 milliGRAM(s) SubCutaneous daily  QUEtiapine 25 milliGRAM(s) Oral daily    MEDICATIONS  (PRN):  LORazepam    Concentrate 1 milliGRAM(s) SubLingual every 6 hours PRN Agitation      RADIOLOGY & ADDITIONAL TESTS:

## 2020-12-19 NOTE — PROGRESS NOTE ADULT - ASSESSMENT
80y/oM PMH dementia, HTN, HLD brought to ER s/p fall at home     COVID PNA  -Probably superimposed aspiration PNA (2/2 gram negative organism)  -CT chest w/multifocal PNA   -ID recs appreciated   -Ceftriaxone changed to Merrem   -Legionella negative, azithromycin stopped   -Cont wrist restraints for safety   -Prn ativan  -s/p dexamethasone, on room air   -Remdesivir deferred    Acute infectious encephalopathy with Alzheimer's dementia   -Multifactorial, worsening dementia w/ behavioral disturbance, viral illness  -Pt at baseline needing assistance in all ADLS and has HHA 7hrs/day  -Supportive care  -Dysphagic and failed multiple swallow evals  -Ongoing palliative discussions; diet changed to pureed w/ nectar. Hospice appropriate, further family discussions per palliative   -palliative following, started on ativan 1mg sublingual PRN   -if sublingual ineffective, change to IV, pt would then be appropriate for Inpatient Hospice as per Palliative     Leukopenia, thrombocytopenia   -Likely 2/2 COVID infection     ROCKY on CKD 3  -Avoid nephrotoxins   -Monitor sCr   -Resolved, c/w IVF    Hypernatremia   -Resolved, Na 145  -IVF adjusted     HTN   -Was not receiving norvasc due to NPO, now on comfort feeds however BP stable off meds     DVT ppx: cont lovenox sq     Prognosis guarded   Dispo: SNF with hospice pending placement  repeat COVID 12/19 pending

## 2020-12-20 NOTE — PROGRESS NOTE ADULT - SUBJECTIVE AND OBJECTIVE BOX
Patient is a 80y old  Male who presents with a chief complaint of failure to thrive with right sided pna (19 Dec 2020 16:30)      INTERVAL HPI/OVERNIGHT EVENTS: seen and examined. Laying in bed, NAD, on room air.     MEDICATIONS  (STANDING):  amLODIPine   Tablet 7.5 milliGRAM(s) Oral daily  dextrose 5%. 1000 milliLiter(s) (75 mL/Hr) IV Continuous <Continuous>  enoxaparin Injectable 40 milliGRAM(s) SubCutaneous daily  QUEtiapine 25 milliGRAM(s) Oral daily    MEDICATIONS  (PRN):  LORazepam    Concentrate 1 milliGRAM(s) SubLingual every 6 hours PRN Agitation      Allergies    No Known Allergies    Intolerances        REVIEW OF SYSTEMS: not able to obtained       Vital Signs Last 24 Hrs  T(C): 36.8 (20 Dec 2020 07:45), Max: 37.1 (19 Dec 2020 21:07)  T(F): 98.3 (20 Dec 2020 07:45), Max: 98.8 (19 Dec 2020 21:07)  HR: 92 (20 Dec 2020 10:56) (82 - 98)  BP: 172/76 (20 Dec 2020 10:56) (111/74 - 172/76)  BP(mean): --  RR: 18 (20 Dec 2020 07:45) (18 - 19)  SpO2: 98% (20 Dec 2020 07:45) (92% - 98%)    PHYSICAL EXAM:  GENERAL: laying in bed.   HEAD:  Atraumatic, Normocephalic  EYES: EOMI, PERRLA, conjunctiva and sclera clear  NECK: Supple, No JVD, Normal thyroid  NERVOUS SYSTEM:  awake,   CHEST/LUNG: lear to percussion bilaterally; No rales, rhonchi, wheezing, or rubs  HEART: Regular rate and rhythm; No murmurs, rubs, or gallops  ABDOMEN: Soft, Nontender, Nondistended; Bowel sounds present  EXTREMITIES:  No clubbing, cyanosis, or edema  SKIN: No rashes or lesions    LABS:              CAPILLARY BLOOD GLUCOSE          RADIOLOGY & ADDITIONAL TESTS:    Imaging Personally Reviewed:  [ ] YES  [ ] NO    Consultant(s) Notes Reviewed:  [ ] YES  [ ] NO    Care Discussed with Consultants/Other Providers [ ] YES  [ ] NO    Plan of Care discussed with Housestaff [ ]YES [ ] NO

## 2020-12-20 NOTE — PROGRESS NOTE ADULT - ASSESSMENT
80y/oM PMH dementia, HTN, HLD brought to ER s/p fall at home     1. COVID PNA  -Probably superimposed aspiration PNA (2/2 gram negative organism); completed course of antibiotics       2. Acute infectious encephalopathy with Alzheimer's dementia   -Multifactorial, worsening dementia w/ behavioral disturbance, viral illness  -Pt at baseline needing assistance in all ADLS and has HHA 7hrs/day  -Supportive care  -Dysphagic and failed multiple swallow evals  -Ongoing palliative discussions; diet changed to pureed w/ nectar. Hospice appropriate, further family discussions per palliative   -palliative following, started on ativan 1mg sublingual PRN   -if sublingual ineffective, change to IV, pt would then be appropriate for Inpatient Hospice as per Palliative     3. Leukopenia, thrombocytopenia   -Likely 2/2 COVID infection     4. ROCKY on CKD 3 - resolved   -  5. Hypernatremia - resolved       6. HTN : BP stable off medications       DVT ppx: cont lovenox sq     Prognosis guarded   Dispo: SNF with hospice pending placement  repeat COVID 12/19 still positive

## 2020-12-21 NOTE — PROGRESS NOTE ADULT - SUBJECTIVE AND OBJECTIVE BOX
Patient is a 80y old  Male who presents with a chief complaint of failure to thrive with right sided pna (20 Dec 2020 11:00)      INTERVAL HPI/OVERNIGHT EVENTS: seen and examined. NO events. Laying in bed,     MEDICATIONS  (STANDING):  amLODIPine   Tablet 7.5 milliGRAM(s) Oral daily  dextrose 5%. 1000 milliLiter(s) (75 mL/Hr) IV Continuous <Continuous>  enoxaparin Injectable 40 milliGRAM(s) SubCutaneous daily  LORazepam   Injectable 1 milliGRAM(s) IV Push once  QUEtiapine 25 milliGRAM(s) Oral daily    MEDICATIONS  (PRN):      Allergies    No Known Allergies    Intolerances        REVIEW OF SYSTEMS: not able to obtain       Vital Signs Last 24 Hrs  T(C): 36.6 (21 Dec 2020 07:50), Max: 36.7 (20 Dec 2020 21:35)  T(F): 97.8 (21 Dec 2020 07:50), Max: 98 (20 Dec 2020 21:35)  HR: 93 (21 Dec 2020 07:50) (88 - 93)  BP: 135/72 (21 Dec 2020 07:50) (112/64 - 156/76)  BP(mean): --  RR: 16 (21 Dec 2020 07:50) (16 - 18)  SpO2: 95% (21 Dec 2020 07:50) (93% - 95%)    PHYSICAL EXAM:  GENERAL: Thin elderly male laying in bed, confused  NAD  HEAD:  temporal wasting   EYES: EOMI, PERRLA, conjunctiva and sclera clear  NERVOUS SYSTEM:  awake, No gross focal deficits  CHEST/LUNG: Clear to percussion bilaterally; No rales, rhonchi, wheezing, or rubs  HEART: Regular rate and rhythm; No murmurs, rubs, or gallops  ABDOMEN: Soft, Nontender, Nondistended; Bowel sounds present  EXTREMITIES:  No clubbing, cyanosis, or edema  SKIN: dry     LABS:              CAPILLARY BLOOD GLUCOSE          RADIOLOGY & ADDITIONAL TESTS:    Imaging Personally Reviewed:  [ ] YES  [ ] NO    Consultant(s) Notes Reviewed:  [ ] YES  [ ] NO    Care Discussed with Consultants/Other Providers [ ] YES  [ ] NO    Plan of Care discussed with Housestaff [ ]YES [ ] NO

## 2020-12-21 NOTE — PROGRESS NOTE ADULT - ASSESSMENT
80yr man, hx of advanced dementia - non ambulatory, minimally verbal, HTN admitted with COVID PNA with further functional decline     Problem/Recommendation - 1:  Problem: Pneumonia due to COVID-19 virus. Recommendation: patient on room air  supportive care.     Problem/Recommendation - 2:  ·  Problem: Dementia without behavioral disturbance, unspecified dementia type.  Recommendation: Patient at baseline  needing assistance in all ADLs has HHA 7hrs/day.  Hospice appropriate.      Problem/Recommendation - 3:  ·  Problem: Dysphagia.  Recommendation: Failed SLP eval    This is a functional decline related to disease ( dementia) and recent infection.  Discussed comfort feeds with family  Family agreed to comfort feeds     Problem/Recommendation - 4:  ·  Problem: Encounter for palliative care  Plan for SNF/LTC.    Patient off restraints, Repeat COVID done- await results  Ativan changed to IV as pharmacy does not carry the oral sublingual, though has NOT needed any dosing  Should patient need recurrent IV Ativan for agitation, may be appropriate for hospice  inpatient unit.    Family has declined home hospice.

## 2020-12-21 NOTE — PROGRESS NOTE ADULT - SUBJECTIVE AND OBJECTIVE BOX
CC:  follow up GOC  INTERVAL HPI/OVERNIGHT EVENTS:    PRESENT SYMPTOMS: SOURCE:  Patient/Family/Team    PAIN SCALE:  0 = none  1 = mild   2 = moderate  3 = severe    Pain:     Dyspnea:  [ ] YES [x ] NO  Anxiety:  [x ] YES - patient redirected/ off restraints [ ] NO  Fatigue: [x ] YES [ ] NO  Nausea: [ ] YES [ x] NO  Loss of Appetite: [ x] YES [ ] NO  Other symptoms: __________    MEDICATIONS  (STANDING):  amLODIPine   Tablet 7.5 milliGRAM(s) Oral daily  dextrose 5%. 1000 milliLiter(s) (75 mL/Hr) IV Continuous <Continuous>  enoxaparin Injectable 40 milliGRAM(s) SubCutaneous daily  LORazepam   Injectable 1 milliGRAM(s) IV Push once  QUEtiapine 25 milliGRAM(s) Oral daily    MEDICATIONS  (PRN):      Allergies    No Known Allergies    Intolerances      Karnofsky Performance Score/Palliative Performance Status Version 2: 30  %    Vital Signs Last 24 Hrs  T(C): 36.6 (21 Dec 2020 07:50), Max: 36.7 (20 Dec 2020 21:35)  T(F): 97.8 (21 Dec 2020 07:50), Max: 98 (20 Dec 2020 21:35)  HR: 93 (21 Dec 2020 07:50) (88 - 93)  BP: 135/72 (21 Dec 2020 07:50) (135/72 - 156/76)  BP(mean): --  RR: 16 (21 Dec 2020 07:50) (16 - 18)  SpO2: 95% (21 Dec 2020 07:50) (95% - 95%)    PHYSICAL EXAM:    General: Elderly man NAD    HEENT: [x ] normal  [ ] dry mouth  [ ] ET tube/trach    Lungs: [x ] comfortable [ ] tachypnea/labored breathing  [ ] excessive secretions    CV: [ x] normal  [ ] tachycardia    GI: [x ] normal  [ ] distended  [ ] tender  [ ] no BS               [ ] PEG/NG/OG tube    : [ ] normal  [ x] incontinent  [ ] oliguria/anuria  [ ] lucia    MSK: [ ] normal  [ x] weakness  [ ] edema             [ ] ambulatory  [ x] bedbound/wheelchair bound    Skin: [ ] normal  [ ] pressure ulcers- Stage_____  [x ] no rash    LABS:              I&O's Summary      RADIOLOGY & ADDITIONAL STUDIES:

## 2020-12-22 NOTE — PROGRESS NOTE ADULT - ASSESSMENT
80y/oM PMH dementia, HTN, HLD brought to ER s/p fall at home     1. COVID PNA  -Probably superimposed aspiration PNA (2/2 gram negative organism); completed course of antibiotics       2. Acute infectious encephalopathy with Alzheimer's dementia   -Multifactorial, worsening dementia w/ behavioral disturbance, viral illness  -Pt at baseline needing assistance in all ADLS and has HHA 7hrs/day  -Supportive care  -Dysphagic and failed multiple swallow evals  -Ongoing palliative discussions; diet changed to pureed w/ nectar. Hospice appropriate, further family discussions per palliative   -palliative following, started on ativan 1mg sublingual PRN   -if sublingual ineffective, change to IV, pt would then be appropriate for Inpatient Hospice as per Palliative     3. Leukopenia, thrombocytopenia   -Likely 2/2 COVID infection     4. ROCKY on CKD 3 - resolved   -  5. Hypernatremia - resolved       6. HTN : BP stable off medications       DVT ppx: cont lovenox sq     Prognosis guarded   Dispo: SNF with hospice pending placement  repeat COVID 12/21 still positive , will repeat tomorrow

## 2020-12-22 NOTE — PROGRESS NOTE ADULT - SUBJECTIVE AND OBJECTIVE BOX
Patient is a 80y old  Male who presents with a chief complaint of failure to thrive with right sided pna (21 Dec 2020 16:12)      INTERVAL HPI/OVERNIGHT EVENTS: No overnight events. Pt not able to provide any ROS.   Awaiting negative COVID test     MEDICATIONS  (STANDING):  amLODIPine   Tablet 7.5 milliGRAM(s) Oral daily  dextrose 5%. 1000 milliLiter(s) (75 mL/Hr) IV Continuous <Continuous>  enoxaparin Injectable 40 milliGRAM(s) SubCutaneous daily  QUEtiapine 25 milliGRAM(s) Oral daily    MEDICATIONS  (PRN):  LORazepam    Concentrate 1 milliGRAM(s) SubLingual every 6 hours PRN Agitation      Allergies    No Known Allergies    Intolerances        REVIEW OF SYSTEMS: not able to provide       Vital Signs Last 24 Hrs  T(C): 36.4 (22 Dec 2020 04:41), Max: 36.8 (22 Dec 2020 00:57)  T(F): 97.5 (22 Dec 2020 04:41), Max: 98.3 (22 Dec 2020 00:57)  HR: 85 (22 Dec 2020 04:41) (85 - 93)  BP: 146/72 (22 Dec 2020 04:41) (136/72 - 146/72)  BP(mean): --  RR: 18 (22 Dec 2020 04:41) (16 - 18)  SpO2: 92% (22 Dec 2020 04:41) (92% - 98%)    PHYSICAL EXAM:  GENERAL: laying in bed, does not respond to questions or commands   HEAD: temporal wasting   EYES: conjunctiva and sclera clear  NECK: Supple, No JVD  NERVOUS SYSTEM:  awake, not oriented, No gross focal deficits  CHEST/LUNG: Clear to percussion bilaterally; No rales, rhonchi, wheezing, or rubs  HEART: Regular rate and rhythm; No murmurs, rubs, or gallops  ABDOMEN: Soft, Nontender, Nondistended; Bowel sounds present  EXTREMITIES:  No clubbing, cyanosis, or edema  SKIN: No rashes or lesions    LABS:              CAPILLARY BLOOD GLUCOSE          RADIOLOGY & ADDITIONAL TESTS:    Imaging Personally Reviewed:  [ ] YES  [ ] NO    Consultant(s) Notes Reviewed:  [ ] YES  [ ] NO    Care Discussed with Consultants/Other Providers [ ] YES  [ ] NO    Plan of Care discussed with Housestaff [ ]YES [ ] NO

## 2020-12-22 NOTE — PROGRESS NOTE ADULT - SUBJECTIVE AND OBJECTIVE BOX
CC:  follow up symptoms  INTERVAL HPI/OVERNIGHT EVENTS:    PRESENT SYMPTOMS: SOURCE:  Patient/Family/Team    PAIN SCALE:  0 = none  1 = mild   2 = moderate  3 = severe    Pain:     Dyspnea:  [ ] YES [ x] NO  Anxiety:  [ ] YES [x ] NO  Fatigue: [ x] YES [ ] NO  Nausea: [ ] YES [ x] NO  Loss of Appetite: [ x] YES [ ] NO  Other symptoms: __________    MEDICATIONS  (STANDING):  amLODIPine   Tablet 7.5 milliGRAM(s) Oral daily  dextrose 5% + sodium chloride 0.9%. 1000 milliLiter(s) (65 mL/Hr) IV Continuous <Continuous>  dextrose 5%. 1000 milliLiter(s) (75 mL/Hr) IV Continuous <Continuous>  enoxaparin Injectable 40 milliGRAM(s) SubCutaneous daily  QUEtiapine 25 milliGRAM(s) Oral daily    MEDICATIONS  (PRN):  LORazepam    Concentrate 1 milliGRAM(s) SubLingual every 6 hours PRN Agitation      Allergies    No Known Allergies    Intolerances    Karnofsky Performance Score/Palliative Performance Status Version 2: 10    %    Vital Signs Last 24 Hrs  T(C): 36.6 (22 Dec 2020 09:00), Max: 36.8 (22 Dec 2020 00:57)  T(F): 97.9 (22 Dec 2020 09:00), Max: 98.3 (22 Dec 2020 00:57)  HR: 102 (22 Dec 2020 09:00) (85 - 102)  BP: 117/71 (22 Dec 2020 09:00) (117/71 - 146/72)  BP(mean): --  RR: 18 (22 Dec 2020 09:00) (16 - 18)  SpO2: 97% (22 Dec 2020 09:00) (92% - 98%)    PHYSICAL EXAM:    General: Elderly man NAD    HEENT: [ x] normal  [ ] dry mouth  [ ] ET tube/trach    Lungs: [x ] comfortable [ ] tachypnea/labored breathing  [ ] excessive secretions    CV: [x ] normal  [ ] tachycardia    GI: [ x] normal  [ ] distended  [ ] tender  [ ] no BS               [ ] PEG/NG/OG tube    : [ ] normal  [ x] incontinent  [ ] oliguria/anuria  [ ] lucia    MSK: [ ] normal  [x ] weakness  [ ] edema             [ ] ambulatory  [x] bedbound/wheelchair bound    Skin: [ ] normal  [ ] pressure ulcers- Stage_____  [x ] no rash    LABS:              I&O's Summary      RADIOLOGY & ADDITIONAL STUDIES:

## 2020-12-22 NOTE — PROGRESS NOTE ADULT - ASSESSMENT
80yr man, hx of advanced dementia - non ambulatory, minimally verbal, HTN admitted with COVID PNA with further functional decline     Problem/Recommendation - 1:  Problem: Pneumonia due to COVID-19 virus. Recommendation: patient on room air  supportive care.     Problem/Recommendation - 2:  ·  Problem: Dementia without behavioral disturbance, unspecified dementia type.  Recommendation: Patient at baseline  needing assistance in all ADLs has HHA 7hrs/day.  Hospice appropriate.      Problem/Recommendation - 3:  ·  Problem: Dysphagia.  Recommendation: Failed SLP eval    This is a functional decline related to disease ( dementia) and recent infection.  Discussed comfort feeds with family  Family agreed to comfort feeds     Problem/Recommendation - 4:  ·  Problem: Encounter for palliative care  Await  SNF/LTC - repeat test remains positive.   Patient thus far has not needed Ativan PRN.  Can change to sublingual/solution  upon discharge.  Per my discussion with pharmacy, it is on formulary.   Should patient need recurrent IV Ativan for agitation, may be appropriate for hospice  inpatient unit.

## 2020-12-22 NOTE — CHART NOTE - NSCHARTNOTEFT_GEN_A_CORE
Source: Patient [ ]  Family [ ]   other [x ]    Current Diet: Diet, Dysphagia 1 Pureed-Nectar Consistency Fluid (12-17-20 @ 09:42)      Patient reports [ ] nausea  [ ] vomiting [ ] diarrhea [ ] constipation  [ ]chewing problems [ ] swallowing issues  [ ] other:     PO intake:  < 50% [ x]   50-75%  [ ]   %  [ ]  other :    Source for PO intake [ ] Patient [ ] family [x ] chart [ ] staff [ ] other    Current Weight:   (12/10) 77.6 kg  No new weight  No edema documented    Pertinent Medications: MEDICATIONS  (STANDING):  amLODIPine   Tablet 7.5 milliGRAM(s) Oral daily  dextrose 5%. 1000 milliLiter(s) (75 mL/Hr) IV Continuous <Continuous>  enoxaparin Injectable 40 milliGRAM(s) SubCutaneous daily  QUEtiapine 25 milliGRAM(s) Oral daily    MEDICATIONS  (PRN):  LORazepam    Concentrate 1 milliGRAM(s) SubLingual every 6 hours PRN Agitation    Pertinent Labs: N/A      Skin: Intact per documentation    Nutrition focused physical exam not conducted at this time due to COVID isolation precautions- found signs of malnutrition [ ]absent [ ]present    Subcutaneous fat loss: [ ] Orbital fat pads region, [ ]Buccal fat region, [ ]Triceps region,  [ ]Ribs region    Muscle wasting: [ ]Temples region, [ ]Clavicle region, [ ]Shoulder region, [ ]Scapula region, [ ]Interosseous region,  [ ]thigh region, [ ]Calf region    Estimated Needs:   [ x] no change since previous assessment  [ ] recalculated:     Current Nutrition Diagnosis:  Pt remains at nutrition risk secondary moderate protein calorie malnutrition (acute- likely chronic) related to inability to consume sufficient protein energy intake due to NPO x7 days with dysphagia in setting of advanced dementia, failure to thrive and +COVID as evidenced by meeting <50% nutrient needs >5 days and + edema (improved). Pt remains confused and uncooperative. Failed SLP, family agreeable to comfort feeds. Awaiting negative COVID for plan for SNF with hospice pending placement. Palliative continues to follow for GOC.     Recommendations:   1) Comfort feeds per pt family wishes.  2) RD remains available as needed.     Monitoring and Evaluation:   [ x] PO intake [x ] Tolerance to diet prescription [X] Weights  [X] Follow up per protocol [X] Labs

## 2020-12-23 NOTE — PROGRESS NOTE ADULT - ASSESSMENT
80yr man, hx of advanced dementia - non ambulatory, minimally verbal, HTN admitted with COVID PNA with further functional decline     Problem/Recommendation - 1:  Problem: Pneumonia due to COVID-19 virus. Recommendation: patient on room air  supportive care.     Problem/Recommendation - 2:  ·  Problem: Dementia without behavioral disturbance, unspecified dementia type.  Recommendation: Patient at baseline  needing assistance in all ADLs has HHA 7hrs/day.  Hospice appropriate.      Problem/Recommendation - 3:  ·  Problem: Dysphagia.  Recommendation: Failed SLP eval    This is a functional decline related to disease ( dementia) and recent infection.  Discussed comfort feeds with family  Family agreed to comfort feeds     Problem/Recommendation - 4:  ·  Problem: Agitation  Patient unable to take po   Ativan 1mg IVP PRN     Problem/Recommendation - 5  Encounter for Palliative Care  Patient with agitation issues needing IV Ativan for  symptom management  Though patient on comfort feeds, unable to handle po  Prognosis days/ maybe weeks ( less likely)  Discussed with hospice - possible inpatient appropriate?  Will need to see extent of symptom burden.   Cont to monitor.

## 2020-12-23 NOTE — PROGRESS NOTE ADULT - ASSESSMENT
80y/oM PMH dementia, HTN, HLD brought to ER s/p fall at home     1. COVID PNA -   -Probably superimposed aspiration PNA (2/2 gram negative organism); completed course of antibiotics   COVID PCR remains positive       2. Acute infectious encephalopathy with Alzheimer's dementia   -Multifactorial, worsening dementia w/ behavioral disturbance, viral illness  -Pt at baseline needing assistance in all ADLS and has HHA 7hrs/day  -Supportive care  -Dysphagic and failed multiple swallow evals  -Ongoing palliative discussions; diet changed to pureed w/ nectar. Hospice appropriate, further family discussions per palliative   -palliative following,   Ativan changed to IV as patient is not taking PO   Currently he is on  IV Ativan and  would be appropriate for Inpatient Hospice as per Palliative   Will speak to palliative team     3. Leukopenia, thrombocytopenia   -Likely 2/2 COVID infection     4. ROCKY on CKD 3 - resolved   -  5. Hypernatremia - resolved       6. HTN : BP stable off medications       DVT ppx: cont lovenox sq     Prognosis guarded   Dispo: SNF with hospice pending placement  repeat COVID 12/21 still positive , will repeat tomorrow

## 2020-12-23 NOTE — PROGRESS NOTE ADULT - SUBJECTIVE AND OBJECTIVE BOX
Patient is a 80y old  Male who presents with a chief complaint of failure to thrive with right sided pna (22 Dec 2020 15:38)      INTERVAL HPI/OVERNIGHT EVENTS: laying in bed  and sliding down   Per nurse, pt is agitated and trying to get out of the bed.   COVID PCR remains positive     MEDICATIONS  (STANDING):  amLODIPine   Tablet 7.5 milliGRAM(s) Oral daily  dextrose 5% + sodium chloride 0.9%. 1000 milliLiter(s) (65 mL/Hr) IV Continuous <Continuous>  dextrose 5%. 1000 milliLiter(s) (75 mL/Hr) IV Continuous <Continuous>  enoxaparin Injectable 40 milliGRAM(s) SubCutaneous daily  QUEtiapine 25 milliGRAM(s) Oral daily    MEDICATIONS  (PRN):  LORazepam   Injectable 1 milliGRAM(s) IV Push every 6 hours PRN Agitation      Allergies    No Known Allergies    Intolerances        REVIEW OF SYSTEMS: not able to obtain       Vital Signs Last 24 Hrs  T(C): 36.7 (23 Dec 2020 00:00), Max: 36.7 (23 Dec 2020 00:00)  T(F): 98.1 (23 Dec 2020 00:00), Max: 98.1 (23 Dec 2020 00:00)  HR: 102 (23 Dec 2020 00:00) (101 - 102)  BP: 135/76 (23 Dec 2020 00:00) (122/77 - 135/76)  BP(mean): --  RR: 18 (23 Dec 2020 00:00) (18 - 18)  SpO2: 95% (23 Dec 2020 00:00) (95% - 95%)    PHYSICAL EXAM:  GENERAL: thin elderly male, laying in bed, not talking or responding to commands.   HEAD:  temporal wasting   EYES: conjunctiva and sclera clear  NECK: Supple, No JVD  NERVOUS SYSTEM:  Awake, no focal weakness   CHEST/LUNG: Clear to percussion bilaterally; No rales, rhonchi, wheezing, or rubs  HEART: Regular rate and rhythm; No murmurs, rubs, or gallops  ABDOMEN: Soft, Nontender, Nondistended; Bowel sounds present  EXTREMITIES:  No clubbing, cyanosis, or edema  SKIN: No rashes or lesions    LABS:              CAPILLARY BLOOD GLUCOSE      POCT Blood Glucose.: 132 mg/dL (23 Dec 2020 08:17)  POCT Blood Glucose.: 128 mg/dL (22 Dec 2020 21:50)      RADIOLOGY & ADDITIONAL TESTS:    Imaging Personally Reviewed:  [ ] YES  [ ] NO    Consultant(s) Notes Reviewed:  [ ] YES  [ ] NO    Care Discussed with Consultants/Other Providers [ ] YES  [ ] NO    Plan of Care discussed with Housestaff [ ]YES [ ] NO

## 2020-12-23 NOTE — PROGRESS NOTE ADULT - SUBJECTIVE AND OBJECTIVE BOX
CC:  follow up GOC  INTERVAL HPI/OVERNIGHT EVENTS:  Discussed with Dr. Coreas- patient unable to take po, agitated needing Ativan  PRESENT SYMPTOMS: SOURCE:  Patient/Family/Team    PAIN SCALE:  0 = none  1 = mild   2 = moderate  3 = severe    Pain:     Dyspnea:  [ ] YES [ ] NO  Anxiety:  [ ] YES [ ] NO  + agitation  Fatigue: [ ] YES [ ] NO  Nausea: [ ] YES [ ] NO  Loss of Appetite: [ ] YES [ ] NO  Other symptoms: __________    MEDICATIONS  (STANDING):  amLODIPine   Tablet 7.5 milliGRAM(s) Oral daily  dextrose 5% + sodium chloride 0.9%. 1000 milliLiter(s) (65 mL/Hr) IV Continuous <Continuous>  dextrose 5%. 1000 milliLiter(s) (75 mL/Hr) IV Continuous <Continuous>  enoxaparin Injectable 40 milliGRAM(s) SubCutaneous daily  QUEtiapine 25 milliGRAM(s) Oral daily    MEDICATIONS  (PRN):  LORazepam   Injectable 1 milliGRAM(s) IV Push every 6 hours PRN Agitation      Allergies    No Known Allergies    Intolerances      Karnofsky Performance Score/Palliative Performance Status Version 2:  20       %    Vital Signs Last 24 Hrs  T(C): 36.7 (23 Dec 2020 00:00), Max: 36.7 (23 Dec 2020 00:00)  T(F): 98.1 (23 Dec 2020 00:00), Max: 98.1 (23 Dec 2020 00:00)  HR: 102 (23 Dec 2020 00:00) (101 - 102)  BP: 135/76 (23 Dec 2020 00:00) (122/77 - 135/76)  BP(mean): --  RR: 18 (23 Dec 2020 00:00) (18 - 18)  SpO2: 95% (23 Dec 2020 00:00) (95% - 95%)    PHYSICAL EXAM:  Physical Exam:  Due to the nature of this patient's COVID-19 isolation status, no bedside physical exam was done to limit spread of infection. Examination highlights were provided by bedside nurse. Objective data were reviewed in detail        LABS:      I&O's Summary    22 Dec 2020 07:01  -  23 Dec 2020 07:00  --------------------------------------------------------  IN: 650 mL / OUT: 0 mL / NET: 650 mL        RADIOLOGY & ADDITIONAL STUDIES:

## 2020-12-24 NOTE — DISCHARGE NOTE PROVIDER - HOSPITAL COURSE
80y/oM PMH dementia, HTN, HLD brought to ER s/p fall at home. Admitted for COVID PNA 12/9/20. Probable superimposed aspiration PNA (2/2 gram negative organism); completed course of antibiotics   COVID PCR remains positive. Also with acute infectious encephalopathy with Alzheimer's dementia, likely multifactorial, worsening dementia w/ behavioral disturbance, viral illness. Pt at baseline needing assistance in all ADLS and has HHA 7hrs/day. Pt had ongoing palliative discussions; diet changed to pureed w/ nectar. Hospice appropriate and Pt stable for d/c to [facility] due to inability to take PO meds due to end stage alzheimers. Ativan changed to IV as patient is not taking PO. Leukopenia resolved, thrombocytopenia improved. Likely 2/2 COVID infection. ROCKY on CKD 3 - resolved. Hypernatremia also resolved.    Vital Signs Last 24 Hrs  T(C): 36.9 (24 Dec 2020 07:57), Max: 36.9 (24 Dec 2020 07:57)  T(F): 98.4 (24 Dec 2020 07:57), Max: 98.4 (24 Dec 2020 07:57)  HR: 63 (24 Dec 2020 07:57) (63 - 100)  BP: 148/83 (24 Dec 2020 07:57) (122/78 - 148/83)  BP(mean): --  RR: 18 (24 Dec 2020 07:57) (18 - 18)  SpO2: 93% (24 Dec 2020 07:57) (81% - 94%)    PHYSICAL EXAM:  GENERAL: thin elderly male, laying in bed, not talking or responding to commands.   HEAD:  temporal wasting   EYES: conjunctiva and sclera clear  NECK: Supple, No JVD  NERVOUS SYSTEM:  Awake, no focal weakness   CHEST/LUNG: Clear to percussion bilaterally; No rales, rhonchi, wheezing, or rubs  HEART: Regular rate and rhythm; No murmurs, rubs, or gallops  ABDOMEN: Soft, Nontender, Nondistended; Bowel sounds present  EXTREMITIES:  No clubbing, cyanosis, or edema  SKIN: No rashes or lesions    Disposition: Stable for discharge to [facility].    Discharge planning time 35 minutes.

## 2020-12-24 NOTE — HOSPICE CARE NOTE - CONVESATION DETAILS
Patient has been approved for a bed at the [facility] in [place] - this writer will request ambulance  from [facility] at 1pm.     Spoke with patient's wife, Claudia, and daughter, Nicky - both expressed happiness that patient was approved for the [facility]. Will continue to follow until patient's d/c from [facility]. 
Discussed hospice services with spouse. Claudia state that she cannot manage patient at home. She wants him in a nursing home.   A list of facilities contracted with hospice emailed to her. 
Weds 12/16: Telephone call made to Pt's spouse Mrs Claudia Bailey [phone]  and [facility] POC discussed in detail including the short term nature of inpt care, Home with hospice and  SNF with Hospice. Mrs Bailey verbalized understanding and stated that she will discuss with her daughters before making a decision for hospice.   Anna Vazquez RN
TC to spouse who reported that she will sign hospice consents. She definitely want patient to be Dc to a nursing home.

## 2020-12-24 NOTE — DISCHARGE NOTE PROVIDER - PROVIDER TOKENS
FREE:[LAST:[primary care medical doctor],PHONE:[(   )    -],FAX:[(   )    -],FOLLOWUP:[1 week]],FREE:[LAST:[[facility] doctor],PHONE:[(   )    -],FAX:[(   )    -],FOLLOWUP:[1-3 days]]

## 2020-12-24 NOTE — PROGRESS NOTE ADULT - SUBJECTIVE AND OBJECTIVE BOX
CC:  follow up symptoms  INTERVAL HPI/OVERNIGHT EVENTS:  spoke with nurse- report more agitated, needs weighted vest  PRESENT SYMPTOMS: SOURCE:  Patient/Family/Team    PAIN SCALE:  0 = none  1 = mild   2 = moderate  3 = severe    Pain:     Dyspnea:  [ ] YES [x ] NO  Anxiety:  [ x] YES [ ] NO  Fatigue: [x ] YES [ ] NO  Nausea: [ ] YES [ ] NO  na  Loss of Appetite: [x ] YES [ ] NO  Other symptoms: __________    MEDICATIONS  (STANDING):  LORazepam   Injectable 1 milliGRAM(s) IV Push every 8 hours    MEDICATIONS  (PRN):  glycopyrrolate Injectable 0.2 milliGRAM(s) IV Push every 6 hours PRN secretions  LORazepam   Injectable 1 milliGRAM(s) IV Push every 6 hours PRN Agitation      Allergies    No Known Allergies    Intolerances      Karnofsky Performance Score/Palliative Performance Status Version 2:  10    %    Vital Signs Last 24 Hrs  T(C): 36.9 (24 Dec 2020 07:57), Max: 36.9 (24 Dec 2020 07:57)  T(F): 98.4 (24 Dec 2020 07:57), Max: 98.4 (24 Dec 2020 07:57)  HR: 63 (24 Dec 2020 07:57) (63 - 100)  BP: 148/83 (24 Dec 2020 07:57) (122/78 - 148/83)  BP(mean): --  RR: 18 (24 Dec 2020 07:57) (18 - 18)  SpO2: 93% (24 Dec 2020 07:57) (81% - 94%)    PHYSICAL EXAM:    General: Elderly man, appears agitated/ fidgety in bed  HEENT: [ x] normal  [ ] dry mouth  [ ] ET tube/trach    Lungs: [ x] comfortable [ ] tachypnea/labored breathing  [ ] excessive secretions    CV: [ x] normal  [ ] tachycardia    GI: [x ] normal  [ ] distended  [ ] tender  [ ] no BS               [ ] PEG/NG/OG tube    : [ ] normal  [ x] incontinent  [ ] oliguria/anuria  [ ] lucia    MSK: [ ] normal  [x ] weakness  [ ] edema             [ ] ambulatory  [ x] bedbound/wheelchair bound    Skin: [ ] normal  [ ] pressure ulcers- Stage_____  [ x] no rash    LABS:    I&O's Summary      RADIOLOGY & ADDITIONAL STUDIES:

## 2020-12-24 NOTE — DISCHARGE NOTE PROVIDER - NSDCCPCAREPLAN_GEN_ALL_CORE_FT
PRINCIPAL DISCHARGE DIAGNOSIS  Diagnosis: Pneumonia due to COVID-19 virus  Assessment and Plan of Treatment: stable, on room air, doing well. Pt is stable for transfer to [facility]      SECONDARY DISCHARGE DIAGNOSES  Diagnosis: HTN (hypertension)  Assessment and Plan of Treatment: stableoff of medications, may follow up with primary MD or [facility] care network MD and can restart if indicated    Diagnosis: Acute kidney injury  Assessment and Plan of Treatment: resolved    Diagnosis: Pneumonia of right upper lobe due to infectious organism  Assessment and Plan of Treatment: s/p antibiotics, improved    Diagnosis: Dementia without behavioral disturbance, unspecified dementia type  Assessment and Plan of Treatment: to continue medications as prescribed. may follow up with primary MD and [facility]

## 2020-12-24 NOTE — PROGRESS NOTE ADULT - ASSESSMENT
80yr man, hx of advanced dementia - non ambulatory, minimally verbal, HTN admitted with COVID PNA with further functional decline. Patient having more agitation symptoms.      Problem/Recommendation - 1:  Problem: Pneumonia due to COVID-19 virus. Recommendation: patient on room air  supportive care.     Problem/Recommendation - 2:  ·  Problem: Dementia without behavioral disturbance, unspecified dementia type.  Recommendation: Patient at baseline  needing assistance in all ADLs has HHA 7hrs/day.  Hospice appropriate.      Problem/Recommendation - 3:  ·  Problem: Dysphagia.  Recommendation: Failed SLP eval    This is a functional decline related to disease ( dementia) and recent infection.  Discussed comfort feeds with family  Family agreed to comfort feeds     Problem/Recommendation - 4:  ·  Problem: Agitation  Patient need PRN doses of Ativan  Patient unable to take po   Ativan 1mg IVP q8hr ATC    Problem/Recommendation - 5  Encounter for Palliative Care  Patient becoming more agitated, needing PRN doses of Ativan.  Multifactorial in etiology - dementia, possible terminal agitation   Ativan made ATC 1mg IVP q8hr for seamless symptom relief  Spoke to daughter Nicky and wife - agreeable for transfer  Spoke to hospice nurse Елена Lebron RN

## 2020-12-24 NOTE — PROGRESS NOTE ADULT - PROVIDER SPECIALTY LIST ADULT
Hospitalist
Infectious Disease
Palliative Care
Palliative Care
Hospitalist
Palliative Care
Hospitalist
Palliative Care
Hospitalist
Hospitalist

## 2020-12-24 NOTE — DISCHARGE NOTE PROVIDER - NSDCMRMEDTOKEN_GEN_ALL_CORE_FT
allopurinol 100 mg oral tablet: 1 tab(s) orally once a day  aspirin 81 mg oral tablet: orally once a day  Keppra 100 mg/mL oral solution: 1 milliliter(s) orally 2 times a day  Namenda 10 mg oral tablet: 1 tab(s) orally 2 times a day  Norvasc 5 mg oral tablet: 1.5 tab(s) orally once a day  pravastatin 40 mg oral tablet: 1 tab(s) orally once a day  SEROquel 25 mg oral tablet: 1 tab(s) orally once a day   glycopyrrolate 0.2 mg/mL injectable solution:  injectable   LORazepam: 1 milligram(s) intravenous every 6 hours, As Needed

## 2020-12-24 NOTE — HOSPICE CARE NOTE - NS PRO AD PATIENT TYPE
Do Not Resuscitate (DNR)
Health Care Proxy (HCP)/Do Not Resuscitate (DNR)/Medical Orders for Life-Sustaining Treatment (MOLST)

## 2020-12-24 NOTE — PROGRESS NOTE ADULT - REASON FOR ADMISSION
failure to thrive with right sided pna

## 2020-12-24 NOTE — DISCHARGE NOTE PROVIDER - CARE PROVIDER_API CALL
primary care medical doctor,   Phone: (   )    -  Fax: (   )    -  Follow Up Time: 1 week    stacie [facility] doctor,   Phone: (   )    -  Fax: (   )    -  Follow Up Time: 1-3 days

## 2020-12-24 NOTE — DISCHARGE NOTE NURSING/CASE MANAGEMENT/SOCIAL WORK - PATIENT PORTAL LINK FT
You can access the FollowMyHealth Patient Portal offered by [facility] by registering at the following website: [email or web address]. By joining [facility]’s FollowMyHealth portal, you will also be able to view your health information using other applications (apps) compatible with our system.

## 2020-12-31 PROBLEM — G30.9 ALZHEIMER'S DEMENTIA: Status: ACTIVE | Noted: 2017-12-06

## 2020-12-31 NOTE — CDI QUERY NOTE - NSCDIOTHERTXTBX_GEN_ALL_CORE_HH
This patient had covid-19 pna.  He is documented with infectious encephalopathy and Alzheimer's.    Can the type of encephalopathy be further clarified, after study?    -likely metabolic encephalopathy on Alzheimer's dementia  -Other (please specify)  -Not clinically significant      CHART DOCUMENTATION:    D/C note:  Hospital Course  80y/oM PMH dementia, HTN, HLD brought to ER s/p fall at home. Admitted for COVID PNA 12/9/20. Probable superimposed aspiration PNA (2/2 gram negative organism); completed course of antibiotics   COVID PCR remains positive. Also with acute infectious encephalopathy with Alzheimer's dementia, likely multifactorial, worsening dementia w/ behavioral disturbance, viral illness.     12/22 Hospitalist note:  2. Acute infectious encephalopathy with Alzheimer's dementia   -Multifactorial, worsening dementia w/ behavioral disturbance, viral illness  -Pt at baseline needing assistance in all ADLS and has HHA 7hrs/day  -Supportive care  -Dysphagic and failed multiple swallow evals  -Ongoing palliative discussions; diet changed to pureed w/ nectar. Hospice appropriate, further family discussions per palliative   -palliative following, started on ativan 1mg sublingual PRN   -if sublingual ineffective, change to IV, pt would then be appropriate for Inpatient Hospice as per Palliative

## 2021-01-25 ENCOUNTER — APPOINTMENT (OUTPATIENT)
Dept: NEUROLOGY | Facility: CLINIC | Age: 81
End: 2021-01-25

## 2021-01-29 ENCOUNTER — APPOINTMENT (OUTPATIENT)
Dept: NEUROLOGY | Facility: CLINIC | Age: 81
End: 2021-01-29

## 2022-06-18 NOTE — HISTORY OF PRESENT ILLNESS
[FreeTextEntry1] : I saw this patient in the office today.\par \par As you recall he has a history of dementia.\par His difficulty has been more for short-term memory than long-term memory.\par He began having memory difficulty around 2013. I had first seen him for this in 2014. He had already been started on Exelon a week or so before his visit here.\par His memory difficulty gradually progressed. I had added Namenda.\par The cognitive difficulties had persisted ever since.\par \par His wife had reported that he is started to have difficulty with activities of daily living including dressing and hygiene.\par \par His wife reported that his insurance is not going to be covering Exelon after January 1 of 2019.\par \par  Self
